# Patient Record
Sex: MALE | Race: OTHER
[De-identification: names, ages, dates, MRNs, and addresses within clinical notes are randomized per-mention and may not be internally consistent; named-entity substitution may affect disease eponyms.]

---

## 2021-02-16 ENCOUNTER — HOSPITAL ENCOUNTER (INPATIENT)
Dept: HOSPITAL 72 - EMR | Age: 61
LOS: 2 days | DRG: 871 | End: 2021-02-18
Payer: MEDICARE

## 2021-02-16 VITALS — SYSTOLIC BLOOD PRESSURE: 119 MMHG | DIASTOLIC BLOOD PRESSURE: 52 MMHG

## 2021-02-16 VITALS — WEIGHT: 175 LBS | BODY MASS INDEX: 27.47 KG/M2 | HEIGHT: 67 IN

## 2021-02-16 VITALS — DIASTOLIC BLOOD PRESSURE: 85 MMHG | SYSTOLIC BLOOD PRESSURE: 133 MMHG

## 2021-02-16 VITALS — DIASTOLIC BLOOD PRESSURE: 73 MMHG | SYSTOLIC BLOOD PRESSURE: 126 MMHG

## 2021-02-16 DIAGNOSIS — I69.322: ICD-10-CM

## 2021-02-16 DIAGNOSIS — R74.01: ICD-10-CM

## 2021-02-16 DIAGNOSIS — C22.8: ICD-10-CM

## 2021-02-16 DIAGNOSIS — K85.90: ICD-10-CM

## 2021-02-16 DIAGNOSIS — E78.5: ICD-10-CM

## 2021-02-16 DIAGNOSIS — N17.9: ICD-10-CM

## 2021-02-16 DIAGNOSIS — I10: ICD-10-CM

## 2021-02-16 DIAGNOSIS — R65.21: ICD-10-CM

## 2021-02-16 DIAGNOSIS — K72.00: ICD-10-CM

## 2021-02-16 DIAGNOSIS — I86.4: ICD-10-CM

## 2021-02-16 DIAGNOSIS — D68.4: ICD-10-CM

## 2021-02-16 DIAGNOSIS — J18.9: ICD-10-CM

## 2021-02-16 DIAGNOSIS — Z88.1: ICD-10-CM

## 2021-02-16 DIAGNOSIS — I48.91: ICD-10-CM

## 2021-02-16 DIAGNOSIS — E11.649: ICD-10-CM

## 2021-02-16 DIAGNOSIS — I85.10: ICD-10-CM

## 2021-02-16 DIAGNOSIS — J91.8: ICD-10-CM

## 2021-02-16 DIAGNOSIS — R18.8: ICD-10-CM

## 2021-02-16 DIAGNOSIS — K76.6: ICD-10-CM

## 2021-02-16 DIAGNOSIS — J96.01: ICD-10-CM

## 2021-02-16 DIAGNOSIS — I85.00: ICD-10-CM

## 2021-02-16 DIAGNOSIS — A41.9: Primary | ICD-10-CM

## 2021-02-16 DIAGNOSIS — N39.0: ICD-10-CM

## 2021-02-16 DIAGNOSIS — Z66: ICD-10-CM

## 2021-02-16 DIAGNOSIS — Z86.74: ICD-10-CM

## 2021-02-16 DIAGNOSIS — D64.9: ICD-10-CM

## 2021-02-16 LAB
ADD MANUAL DIFF: NO
ALBUMIN SERPL-MCNC: 1.8 G/DL (ref 3.4–5)
ALBUMIN/GLOB SERPL: 0.3 {RATIO} (ref 1–2.7)
ALP SERPL-CCNC: 634 U/L (ref 46–116)
ALT SERPL-CCNC: 74 U/L (ref 12–78)
AMMONIA PLAS-SCNC: 71 UMOL/L (ref 11–32)
ANION GAP SERPL CALC-SCNC: 12 MMOL/L (ref 5–15)
APPEARANCE UR: (no result)
APTT BLD: 43 SEC (ref 23–33)
APTT PPP: (no result) S
AST SERPL-CCNC: 180 U/L (ref 15–37)
BASOPHILS NFR BLD AUTO: 0.3 % (ref 0–2)
BILIRUB DIRECT SERPL-MCNC: 18.8 MG/DL (ref 0–0.3)
BILIRUB SERPL-MCNC: 26 MG/DL (ref 0.2–1)
BUN SERPL-MCNC: 45 MG/DL (ref 7–18)
CALCIUM SERPL-MCNC: 8.3 MG/DL (ref 8.5–10.1)
CHLORIDE SERPL-SCNC: 101 MMOL/L (ref 98–107)
CK SERPL-CCNC: 57 U/L (ref 26–308)
CO2 SERPL-SCNC: 20 MMOL/L (ref 21–32)
CREAT SERPL-MCNC: 1.8 MG/DL (ref 0.55–1.3)
EOSINOPHIL NFR BLD AUTO: 0.3 % (ref 0–3)
ERYTHROCYTE [DISTWIDTH] IN BLOOD BY AUTOMATED COUNT: 22.9 % (ref 11.6–14.8)
GLOBULIN SER-MCNC: 5.2 G/DL
GLUCOSE UR STRIP-MCNC: (no result) MG/DL
HCT VFR BLD CALC: 28.6 % (ref 42–52)
HGB BLD-MCNC: 8.9 G/DL (ref 14.2–18)
INR PPP: 1.8 (ref 0.9–1.1)
KETONES UR QL STRIP: (no result)
LEUKOCYTE ESTERASE UR QL STRIP: (no result)
LYMPHOCYTES NFR BLD AUTO: 5.5 % (ref 20–45)
MCV RBC AUTO: 99 FL (ref 80–99)
MONOCYTES NFR BLD AUTO: 11.2 % (ref 1–10)
NEUTROPHILS NFR BLD AUTO: 82.8 % (ref 45–75)
NITRITE UR QL STRIP: POSITIVE
PH UR STRIP: 5 [PH] (ref 4.5–8)
PLATELET # BLD: 118 K/UL (ref 150–450)
POTASSIUM SERPL-SCNC: 4.6 MMOL/L (ref 3.5–5.1)
PROT UR QL STRIP: (no result)
RBC # BLD AUTO: 2.88 M/UL (ref 4.7–6.1)
SODIUM SERPL-SCNC: 133 MMOL/L (ref 136–145)
SP GR UR STRIP: 1.02 (ref 1–1.03)
UROBILINOGEN UR-MCNC: 8 MG/DL (ref 0–1)
WBC # BLD AUTO: 14.6 K/UL (ref 4.8–10.8)

## 2021-02-16 PROCEDURE — 94003 VENT MGMT INPAT SUBQ DAY: CPT

## 2021-02-16 PROCEDURE — 87040 BLOOD CULTURE FOR BACTERIA: CPT

## 2021-02-16 PROCEDURE — 85651 RBC SED RATE NONAUTOMATED: CPT

## 2021-02-16 PROCEDURE — 85730 THROMBOPLASTIN TIME PARTIAL: CPT

## 2021-02-16 PROCEDURE — 92950 HEART/LUNG RESUSCITATION CPR: CPT

## 2021-02-16 PROCEDURE — 86304 IMMUNOASSAY TUMOR CA 125: CPT

## 2021-02-16 PROCEDURE — 86709 HEPATITIS A IGM ANTIBODY: CPT

## 2021-02-16 PROCEDURE — 82746 ASSAY OF FOLIC ACID SERUM: CPT

## 2021-02-16 PROCEDURE — 80202 ASSAY OF VANCOMYCIN: CPT

## 2021-02-16 PROCEDURE — 84100 ASSAY OF PHOSPHORUS: CPT

## 2021-02-16 PROCEDURE — 82140 ASSAY OF AMMONIA: CPT

## 2021-02-16 PROCEDURE — 86900 BLOOD TYPING SEROLOGIC ABO: CPT

## 2021-02-16 PROCEDURE — 93970 EXTREMITY STUDY: CPT

## 2021-02-16 PROCEDURE — 87086 URINE CULTURE/COLONY COUNT: CPT

## 2021-02-16 PROCEDURE — 83690 ASSAY OF LIPASE: CPT

## 2021-02-16 PROCEDURE — 87181 SC STD AGAR DILUTION PER AGT: CPT

## 2021-02-16 PROCEDURE — 86901 BLOOD TYPING SEROLOGIC RH(D): CPT

## 2021-02-16 PROCEDURE — 84550 ASSAY OF BLOOD/URIC ACID: CPT

## 2021-02-16 PROCEDURE — 82248 BILIRUBIN DIRECT: CPT

## 2021-02-16 PROCEDURE — 82150 ASSAY OF AMYLASE: CPT

## 2021-02-16 PROCEDURE — 83605 ASSAY OF LACTIC ACID: CPT

## 2021-02-16 PROCEDURE — 96376 TX/PRO/DX INJ SAME DRUG ADON: CPT

## 2021-02-16 PROCEDURE — 82378 CARCINOEMBRYONIC ANTIGEN: CPT

## 2021-02-16 PROCEDURE — 85610 PROTHROMBIN TIME: CPT

## 2021-02-16 PROCEDURE — 36415 COLL VENOUS BLD VENIPUNCTURE: CPT

## 2021-02-16 PROCEDURE — 80053 COMPREHEN METABOLIC PANEL: CPT

## 2021-02-16 PROCEDURE — 71045 X-RAY EXAM CHEST 1 VIEW: CPT

## 2021-02-16 PROCEDURE — 94002 VENT MGMT INPAT INIT DAY: CPT

## 2021-02-16 PROCEDURE — 87340 HEPATITIS B SURFACE AG IA: CPT

## 2021-02-16 PROCEDURE — 96365 THER/PROPH/DIAG IV INF INIT: CPT

## 2021-02-16 PROCEDURE — 85007 BL SMEAR W/DIFF WBC COUNT: CPT

## 2021-02-16 PROCEDURE — 82977 ASSAY OF GGT: CPT

## 2021-02-16 PROCEDURE — 85025 COMPLETE CBC W/AUTO DIFF WBC: CPT

## 2021-02-16 PROCEDURE — 83036 HEMOGLOBIN GLYCOSYLATED A1C: CPT

## 2021-02-16 PROCEDURE — 82962 GLUCOSE BLOOD TEST: CPT

## 2021-02-16 PROCEDURE — 86140 C-REACTIVE PROTEIN: CPT

## 2021-02-16 PROCEDURE — 84484 ASSAY OF TROPONIN QUANT: CPT

## 2021-02-16 PROCEDURE — 86850 RBC ANTIBODY SCREEN: CPT

## 2021-02-16 PROCEDURE — 83540 ASSAY OF IRON: CPT

## 2021-02-16 PROCEDURE — 82607 VITAMIN B-12: CPT

## 2021-02-16 PROCEDURE — 83735 ASSAY OF MAGNESIUM: CPT

## 2021-02-16 PROCEDURE — 84443 ASSAY THYROID STIM HORMONE: CPT

## 2021-02-16 PROCEDURE — 81003 URINALYSIS AUTO W/O SCOPE: CPT

## 2021-02-16 PROCEDURE — 82550 ASSAY OF CK (CPK): CPT

## 2021-02-16 PROCEDURE — 86920 COMPATIBILITY TEST SPIN: CPT

## 2021-02-16 PROCEDURE — 83550 IRON BINDING TEST: CPT

## 2021-02-16 PROCEDURE — 86803 HEPATITIS C AB TEST: CPT

## 2021-02-16 PROCEDURE — 93005 ELECTROCARDIOGRAM TRACING: CPT

## 2021-02-16 PROCEDURE — 74176 CT ABD & PELVIS W/O CONTRAST: CPT

## 2021-02-16 PROCEDURE — 80061 LIPID PANEL: CPT

## 2021-02-16 PROCEDURE — 86705 HEP B CORE ANTIBODY IGM: CPT

## 2021-02-16 PROCEDURE — 83880 ASSAY OF NATRIURETIC PEPTIDE: CPT

## 2021-02-16 PROCEDURE — 99291 CRITICAL CARE FIRST HOUR: CPT

## 2021-02-16 PROCEDURE — 84134 ASSAY OF PREALBUMIN: CPT

## 2021-02-16 PROCEDURE — 96375 TX/PRO/DX INJ NEW DRUG ADDON: CPT

## 2021-02-16 PROCEDURE — 85379 FIBRIN DEGRADATION QUANT: CPT

## 2021-02-16 PROCEDURE — 82728 ASSAY OF FERRITIN: CPT

## 2021-02-16 PROCEDURE — 82803 BLOOD GASES ANY COMBINATION: CPT

## 2021-02-16 PROCEDURE — 82105 ALPHA-FETOPROTEIN SERUM: CPT

## 2021-02-16 PROCEDURE — 83615 LACTATE (LD) (LDH) ENZYME: CPT

## 2021-02-16 PROCEDURE — 93306 TTE W/DOPPLER COMPLETE: CPT

## 2021-02-16 PROCEDURE — 76700 US EXAM ABDOM COMPLETE: CPT

## 2021-02-16 RX ADMIN — DOCUSATE SODIUM SCH MG: 100 CAPSULE, LIQUID FILLED ORAL at 20:34

## 2021-02-16 NOTE — NUR
NURSE NOTES:

received pt from ED. pt is alert and awake. assisted to the bed. denies any pain at this 
time. respiration is even and unlabored on room air. noted with generalized edema. elevated 
extremities on a pillow. pt is made comfortable in bed. placed call light within reach.

## 2021-02-16 NOTE — HISTORY AND PHYSICAL REPORT
DATE OF ADMISSION:  02/16/2021

CHIEF COMPLAINT:  Patient is a 60-year-old  male who presents with

chief complaint of generalized pain.



HISTORY OF PRESENT ILLNESS:  Patient has history of hypertension and

hypercholesterolemia.  Patient presented to [facility]

complaining of 2-day history of generalized pain.  Patient was found to be

in liver failure and also with atrial fibrillation with rapid ventricular

rate.  Patient is admitted with new-onset liver failure.



REVIEW OF SYSTEMS:  CONSTITUTIONAL:  Patient denies weight loss or gain.

Patient denies fevers or chills.  HEENT:  Patient denies ear or throat

pain.  Patient denies headache.  CARDIOVASCULAR:  Patient denies

palpitations or chest pain.  CHEST:  Patient denies wheeze or shortness of

breath.  ABDOMINAL:  Patient complains of generalized abdominal pain.

Patient denies nausea, vomiting, diarrhea, or constipation.

GENITOURINARY:  Patient denies dysuria or increased frequency of

urination.  NEUROMUSCULAR:  Patient complains of generalized pain as

above.  Patient denies seizures or generalized weakness.



PAST MEDICAL HISTORY:  Significant for:



1. Hypertension.

2. Hypercholesterolemia.



PAST SURGICAL HISTORY:  Patient denies.



CURRENT MEDICATIONS:

1. Atorvastatin 40 mg 1 tablet p.o. at bedtime.

2. Metoprolol tartrate 25 mg p.o. twice daily.



ALLERGIES:  Keflex.



SOCIAL HISTORY:  Patient is .  Patient denies tobacco or alcohol

use.



PHYSICAL EXAMINATION:

VITAL SIGNS:  Temperature 97.0, respirations 16, pulse 64 to 150, blood

pressure 119/52.

GENERAL:  Patient is well-developed and well-nourished  male, who

is obviously jaundiced.

HEENT:  Eyes with scleral icterus, otherwise pupils are equal and

responsive to light and accommodation.  Extraocular movements are

intact.

NECK:  Supple without lymphadenopathy.

CHEST:  Lungs are clear to auscultation bilaterally without wheezes or

rales.

CARDIOVASCULAR:  Regular rhythm and rate.  S1-S2 are normal without

murmurs, rubs, or gallops.

ABDOMEN:  Distended, decreased bowel sounds with no rebound or guarding to

palpitation.

RECTAL/GENITAL:  Refused.

NEUROLOGIC:  Cranial nerves II through XII are grossly intact without focal

deficits.  Motor strength is 5/5 bilaterally.  Deep tendon reflexes are 2+

plantar.



LABORATORY STUDIES:  WBC 14.6, hemoglobin 8.9, hematocrit 28.6, platelets

118,000.  Sodium 133, potassium 4.6, chloride 101, CO2 20, BUN 45,

creatinine 1.8, glucose 101.  Total bilirubin elevated at 26.0, direct

bilirubin elevated at 18.8, AST elevated at 180, alkaline phosphatase

elevated at 1634.  Ammonia level elevated at 71.  Lipase elevated at

greater than 2000.  Troponin 0.014.  Urinalysis showed 2+ protein, 1+

glucose, 1+ ketones, 4+ blood, positive nitrite, 3+ bilirubin with wbc's

too numerous to count.  A CT scan of the abdomen and pelvis was reported

as liver mass consistent with liver cancer and cirrhosis.



ASSESSMENT:  This is a 60-year-old  male.



1. Liver mass.

2. Liver failure.

3. Urinary tract infection.

4. Atrial fibrillation with rapid ventricular rate.

5. Pancreatitis.

6. Coagulopathy.

7. Renal failure.

8. Elevated liver function tests.

9. Hypertension.

10. Hypercholesterolemia.



TREATMENT:

1. Liver mass.  An Oncology consultation has been obtained with Dr. Lira.  Patient has a stated history of possible liver cancer, however

this has not been diagnosed.

2. Liver failure.  This may be secondary to liver cancer as above.  A

Gastroenterology consultation has been obtained with Dr. Shaq Linder.

3. Urinary tract infection.  Patient has been placed empirically on

intravenous Levaquin.  A urine culture is pending.

4. Atrial fibrillation with rapid ventricular rate.  A Cardiology

consultation has been obtained with Dr. Eduardo Briggs.

5. Pancreatitis.  As above, a Gastroenterology consultation has been

obtained with Dr. Shaq Linder.

6. Coagulopathy.  This is secondary to liver failure as above.

7. Renal failure.  This is probably secondary to hepatorenal failure.

8. Elevated liver function tests.

9. Hypertension.  Continue metoprolol as above.

10. Hypercholesterolemia.  Continue atorvastatin as above.









  ______________________________________________

  Tra Anguiano M.D. DR:  SEVERIANO

D:  02/16/2021 18:14

T:  02/16/2021 18:25

JOB#:  [id number]

CC:

## 2021-02-16 NOTE — DIAGNOSTIC IMAGING REPORT
Indication: Abdominal pain

 

Technique: Spiral acquisitions obtained through the abdomen and pelvis. No oral

contrast utilized, per emergency room physician request No IV contrast utilized,  per

referring physician request.. Multiplanar reconstructions were generated. Total dose

length product 595 mGycm. CTDIvol(s) 10 mGy. Dose reduction achieved using automated

exposure control

 

 

Comparison: None

 

Findings: Lack of enteric contrast limits assessment of the GI tract. There are

colonic diverticula. No definite evidence of diverticulitis. The appendix is normal.

There is a moderate amount of free intraperitoneal fluid. No free intraperitoneal

gas. The stomach and duodenum are unremarkable.

 

Lack of IV contrast limits assessment of the solid organs. The liver is very atrophic

and demonstrates extensive surface nodularity. Surgical clips are seen at the

inferior aspect of the left hepatic lobe. Arising from segment 4A, there is a round

exophytic mass which measures approximately 6.7 cm in diameter. This demonstrates

some peripheral calcification. Large perigastric and periesophageal varices are

demonstrated. There is a moderate amount of ascites fluid. A ventriculoperitoneal

shunt catheter is seen coursing through the peritoneal space and has its tip in the

pelvis.

 

The pancreas, spleen, adrenals, kidneys are grossly unremarkable. No retroperitoneal

or mesenteric mass or adenopathy. No pelvic mass or adenopathy.

 

There is bilateral gynecomastia. There is diffuse edema of the subcutaneous and

abdominal fat. There is a massive right pleural effusion. This results in complete

atelectasis of the right lower lobe. The included left lung base demonstrates some

atelectasis, no pleural fluid. The heart is borderline large. The bones are

unremarkable for age.

 

Impression: Evidence of hepatic cirrhosis, with atrophy and surface nodularity

 

6.7 cm round exophytic mass arising from segment 4A of the liver. This is presumably

related to stated clinical history of liver cancer. Surgical clips are seen adjacent

to this mass.

 

Evidence of portal hypertension, with large periesophageal and perigastric varices

 

Moderate ascites, likely related to liver disease. However, there is also a

ventriculoperitoneal shunt catheter which may be contributing some volume to the

ascites.

 

There is evidence of anasarca, including massive right pleural effusion, diffuse

edema of the subcutaneous and abdominal fat limiting assessment of the GI tract, due

to lack of enteric contrast administration. No gross acute GI pathology.

 

Colonic diverticulosis. No evidence of diverticulitis

 

Borderline cardiomegaly

 

Incidental finding bilateral gynecomastia

 

The CT scanner at [facility] is accredited by the [employer] College of

Radiology and the scans are performed using protocols designed to limit radiation

exposure to as low as reasonably achievable to attain images of sufficient resolution

adequate for diagnostic evaluation.

## 2021-02-16 NOTE — HISTORY & PHYSICAL
History and Physical


History & Physicial


Dictated for Int Med-Dr Harper no. [id number].











Tra Anguiano MD               Feb 16, 2021 18:13

## 2021-02-16 NOTE — DIAGNOSTIC IMAGING REPORT
Indication: Shortness of breath

 

Technique: One view of the chest

 

Comparison: none

 

Findings: There is a massive right pleural effusion, with only small amount of

aerated lung. The left pleural spaces clear. There may be some congestion of the left

lung interstitium. The heart size is difficult to assess. There is

ventriculoperitoneal shunt tubing

 

Impression: Massive right pleural effusion

 

Interstitial edema

## 2021-02-16 NOTE — NUR
NURSE HAND-OFF REPORT: 



Important Events on Shift:new admit

Patient Status: asleep and arousable

Diet: npo



Pending Orders: n/a

Pending Results/Labs:n/a

Pending MD notification:n/a



Latest Vital Signs: Temperature 98.6 , Pulse 117 , B/P 133 /85 , Respiratory Rate 20 , O2 
 , Nasal Cannula, O2 Flow Rate 2.0 .  

Vital Sign Comment: stable



EKG Rhythm: Atrial Fibrillation

Rhythm change?: N 

MD Notified?: -

MD Response: 



Latest Espinoza Fall Score: 45  

Fall Risk: High Risk 

Safety Measures: Call light , Bed Alarm , Side Rails Side Rails x3, Bed position Low and 
Locked.

Fall Precautions: 



Report given to Serina.

## 2021-02-16 NOTE — EMERGENCY ROOM REPORT
History of Present Illness


General


Chief Complaint:  Abdominal Pain


Source:  Patient





Present Illness


HPI


Patient presents complaining of total body pain.  He is also jaundiced.  

Apparently was recently discharged from the hospital.  The pain throughout his 

body is severe.  He was discharged on Dilaudid.  Unknown whether he has been 

receiving this medication.





He has a history of liver cancer.  There is a suggestion that he was discharged 

to hospice.  There is no clear documentation of this.  However family contacted 

his private doctor and requested repeat evaluation and therefore he was brought 

to the emergency department.





History of diabetes, hypertension and cirrhosis.





Patient denies fevers or chills.





Review of outside records reveal that he has had atrial fibrillation with rapid 

ventricular rate.





Patient denies dysuria.  He has had loose stools without passing any blood or 

melena.  He is felt nauseated but denies vomiting.  There is no coffee-ground or

bloody vomitus.





The patient has a nonproductive cough.  He does not feel short of breath at this

time.


Allergies:  


Coded Allergies:  


     CEPHALEXIN (Verified  Allergy, Unknown, 2/16/21)





COVID-19 Screening


Contact w/high risk pt:  No


Experienced COVID-19 symptoms?:  No


COVID-19 Testing performed PTA:  No





Patient History


Past Medical History:  see triage record, other - Varices, liver mass


Past Surgical History:  other - shunt


Social History:  Denies: smoking


Social History Narrative


With family


Reviewed Nursing Documentation:  PMH: Agreed; PSxH: Agreed





Nursing Documentation-PMH


Past Medical History:  No History, Except For


Hx Hypertension:  Yes


Hx Diabetes:  Yes


Hx Cancer:  Yes - liver


Hx Gastrointestinal Problems:  Yes - liver cirrosis





Review of Systems


All Other Systems:  negative except mentioned in HPI





Physical Exam





Vital Signs








  Date Time  Temp Pulse Resp B/P (MAP) Pulse Ox O2 Delivery O2 Flow Rate FiO2


 


2/16/21 10:40 97.0 56 14 129/58 (81) 96 Room Air  








Sp02 EP Interpretation:  reviewed, normal


General Appearance:  mild distress, other - Jaundiced in mild distress, 

Chronically Ill


Eyes:  bilateral eye PERRL, bilateral eye EOMI, bilateral eye scleral icterus


ENT:  dry mucus membranes - Poor dentition


Neck:  full range of motion, supple


Respiratory:  lungs clear, normal breath sounds, no respiratory distress


Cardiovascular #1:  tachycardia, edema


Cardiovascular #2:  2+ radial (R)


Gastrointestinal:  soft, no guarding, no rebound, tenderness, decreased bowel 

sounds, overweight


Genitourinary:  no CVA tenderness


Musculoskeletal:  back normal, no calf tenderness


Neurologic:  alert, oriented, DTRs symmetric, sensory intact, motor weakness - 

Diffuse, other - No asterixis


Psychiatric:  mood/affect normal


Skin:  warm/dry, jaundice





Procedures


Critical Care Time


Critical Care Time


Total Critical Care Time: 35 min bedside evaluation and treatment excludes 

procedures (EKG).


Reason for critical care: Liver failure, atrial fibrillation rapid ventricular 

response, urinary tract infection with increased lactic lactic acid, repeat 

evaluations, review of outside records


Possible complications: hypotension, hypertension, MI, shock, arrhythmias, 

metabolic acidosis, end organ damage, respiratory failure.


Interventions: Review of outside records, evaluation of possible hospice status,

treatment of pain with repeat evaluations. Evaluation of tachycardia.  Treatment

of possible early sepsis with elevated lactic acid.  Initiation of antibiotics, 

fluids.  Metoprolol given for  atrial fibrillation with rapid ventricular 

response.


Course: Patient with jaundice and history of liver cancer presents with body 

pain.  Complicated patient.  Please see interventions above.  Repeat evaluations

undertaken.  Repeat treatments of pain.  Treatment of tachycardia initially with

pain medication.  Fluid bolus and antibiotics instituted for urinary tract 

infection when urine was finally obtained.  Repeated doses of analgesics with 

repeat evaluations.  Discussion with admitting physician.


Consultations: nursing staff, primary physician office staff,  admitting 

physician


Performed by: Dr. Corcoran


Tolerated well condition = serious





Medical Decision Making


Diagnostic Impression:  


   Primary Impression:  


   Liver failure


   Qualified Codes:  K72.00 - Acute and subacute hepatic failure without coma


   Additional Impressions:  


   Pancreatitis


   Qualified Codes:  K85.90 - Acute pancreatitis without necrosis or infection, 

   unspecified


   Leukocytosis


   Qualified Codes:  D72.829 - Elevated white blood cell count, unspecified


   Renal insufficiency


   Coagulopathy


   Atrial fibrillation with RVR


   UTI (urinary tract infection)


   Qualified Codes:  N39.0 - Urinary tract infection, site not specified


   Pleural effusion associated with hepatic disorder


ER Course


Patient with known liver disease presents with total body pain.  Differential 

includes sepsis, liver failure, occult infection, spontaneous bacterial 

peritonitis, pneumonia, opiate dependence amongst others.  Patient evaluated 

EKG, chest x-ray, CT of the abdomen and labs.  Patient treated with analgesia.  

Complicated patient.  Patient placed on cardiac monitor.





Ext jug started by me.





EKG without injury.  Tachycardia with PVCs.  Chest x-ray poor inspiration.  CT 

abdomen see below with liver mass.  Labs with leukocytosis.  Anemia.  Elevated 

lipase.  Elevated ammonia.





Patient dropped his oxygen saturation to 92%.  Oxygen begun.  In no respiratory 

distress.





Patient c/o pain.  Morphine increased.





Still complain of pain and Dilaudid administered.





A fib with RVR.  Metoprolol ordered.  Also small fluid bolus.  1420





Delay for obtaining urinalysis.  Late addition of lactic acid which returns el

evated.  Antibiotics have been started for pyuria. Sepsis re-evaluation.  Mental

status unchanged.  Good perfusion. 





Heart rate improved with metoprolol and fluids.  Discussed with the primary 

physician.





Laboratory Tests








Test


 2/16/21


11:11 2/16/21


14:24


 


White Blood Count


 14.6 K/UL


(4.8-10.8)  H 





 


Red Blood Count


 2.88 M/UL


(4.70-6.10)  L 





 


Hemoglobin


 8.9 G/DL


(14.2-18.0)  L 





 


Hematocrit


 28.6 %


(42.0-52.0)  L 





 


Mean Corpuscular Volume 99 FL (80-99)   


 


Mean Corpuscular Hemoglobin


 30.9 PG


(27.0-31.0) 





 


Mean Corpuscular Hemoglobin


Concent 31.1 G/DL


(32.0-36.0)  L 





 


Red Cell Distribution Width


 22.9 %


(11.6-14.8)  H 





 


Platelet Count


 118 K/UL


(150-450)  L 





 


Mean Platelet Volume


 8.0 FL


(6.5-10.1) 





 


Neutrophils (%) (Auto)


 82.8 %


(45.0-75.0)  H 





 


Lymphocytes (%) (Auto)


 5.5 %


(20.0-45.0)  L 





 


Monocytes (%) (Auto)


 11.2 %


(1.0-10.0)  H 





 


Eosinophils (%) (Auto)


 0.3 %


(0.0-3.0) 





 


Basophils (%) (Auto)


 0.3 %


(0.0-2.0) 





 


Prothrombin Time


 18.8 SEC


(9.30-11.50)  H 





 


Prothrombin Time INR


 1.8 (0.9-1.1)


H 





 


Activated Partial


Thromboplast Time 43 SEC (23-33)


H 





 


Sodium Level


 133 MMOL/L


(136-145)  L 





 


Potassium Level


 4.6 MMOL/L


(3.5-5.1) 





 


Chloride Level


 101 MMOL/L


() 





 


Carbon Dioxide Level


 20 MMOL/L


(21-32)  L 





 


Anion Gap


 12 mmol/L


(5-15) 





 


Blood Urea Nitrogen


 45 mg/dL


(7-18)  H 





 


Creatinine


 1.8 MG/DL


(0.55-1.30)  H 





 


Estimated Glomerular


Filtration Rate 38.9 mL/min


(>60) 





 


Glucose Level


 101 MG/DL


() 





 


Lactic Acid Level


 3.40 mmol/L


(0.4-2.0)  H 





 


Calcium Level


 8.3 MG/DL


(8.5-10.1)  L 





 


Total Bilirubin


 26.0 MG/DL


(0.2-1.0)  H 





 


Direct Bilirubin


 18.8 MG/DL


(0.0-0.3)  H 





 


Aspartate Amino Transferase


(AST) 180 U/L


(15-37)  H 





 


Alanine Aminotransferase (ALT)


 74 U/L (12-78)


 





 


Alkaline Phosphatase


 634 U/L


()  H 





 


Ammonia


 71 umol/L


(11-32)  H 





 


Total Creatine Kinase


 57 U/L


() 





 


Troponin I


 0.014 ng/mL


(0.000-0.056) 





 


Total Protein


 7.0 G/DL


(6.4-8.2) 





 


Albumin


 1.8 G/DL


(3.4-5.0)  L 





 


Globulin 5.2 g/dL   


 


Albumin/Globulin Ratio


 0.3 (1.0-2.7)


L 





 


Lipase


 > 2000 U/L


()  H 





 


Serum Alcohol < 3 mg/dL   


 


Urine Color  Orange  


 


Urine Appearance  Cloudy  


 


Urine pH  5 (4.5-8.0)  


 


Urine Specific Gravity


 


 1.020


(1.005-1.035)


 


Urine Protein


 


 2+ (NEGATIVE)


H


 


Urine Glucose (UA)


 


 1+ (NEGATIVE)


H


 


Urine Ketones


 


 1+ (NEGATIVE)


H


 


Urine Blood


 


 4+ (NEGATIVE)


H


 


Urine Nitrite


 


 Positive


(NEGATIVE)  H


 


Urine Bilirubin


 


 3+ (NEGATIVE)


H


 


Urine Ictotest


 


 Positive


(NEGATIVE)


 


Urine Urobilinogen


 


 8 MG/DL


(0.0-1.0)  H


 


Urine Leukocyte Esterase


 


 3+ (NEGATIVE)


H


 


Urine RBC


 


 2-4 /HPF (0 -


0)  H


 


Urine WBC


 


 Tntc /HPF (0 -


0)  H


 


Urine Squamous Epithelial


Cells 


 Occasional


/LPF


 


Urine Bacteria


 


 Many /HPF


(NONE)  H


 


Urine Coarse Granular Casts


 


 0-2 /LPF


(NONE)  H








EKG Diagnostic Results


Rate:  normal


Rhythm:  other - Atrial bigeminy


ST Segments:  no acute changes





Rhythm Strip Diag. Results


EP Interpretation:  yes


Rhythm:  NSR, other - Atrial bigeminy





Chest X-Ray Diagnostic Results


Chest X-Ray Diagnostic Results :  


   Chest X-Ray Ordered:  Yes


   # of Views/Limited/Complete:  1 View


   Indication:  Other


   EP Interpretation:  Yes


   Interpretation:  no pneumothorax, other - Right pleural effusion


   Impression:  Other





CT/MRI/US Diagnostic Results


CT/MRI/US Diagnostic Results :  


   Imaging Test Ordered:  Abdomen and pelvis


   Impression


Impression: Evidence of hepatic cirrhosis, with atrophy and surface nodularity


 


6.7 cm round exophytic mass arising from segment 4A of the liver. This is 

presumably


related to stated clinical history of liver cancer. Surgical clips are seen 

adjacent


to this mass.


 


Evidence of portal hypertension, with large periesophageal and perigastric 

varices


 


Moderate ascites, likely related to liver disease. However, there is also a 

ventriculoperitoneal shunt catheter which may be contributing some volume to the

ascites.


 


There is evidence of anasarca, including massive right pleural effusion, diffuse

edema of the subcutaneous and abdominal fat limiting assessment of the GI tract,

due to lack of enteric contrast administration. No gross acute GI pathology.


 


Colonic diverticulosis. No evidence of diverticulitis


 


Borderline cardiomegaly


 


Incidental finding bilateral gynecomastia





Last Vital Signs








  Date Time  Temp Pulse Resp B/P (MAP) Pulse Ox O2 Delivery O2 Flow Rate FiO2


 


2/16/21 16:00 98.6 109 20 133/85 (101) 100   


 


2/16/21 15:49      Room Air  


 


2/16/21 13:11       2.0 


 


2/16/21 13:11        100








Status:  improved


Disposition:  ADMITTED AS INPATIENT


Condition:  Serious











Arnaud Corcoran MD                Feb 16, 2021 11:26

## 2021-02-16 NOTE — NUR
NURSE NOTES:

Received report from LEONOR Roland. Pt is A/O x1-2 and verbally responsive. Pt is Cam[place] 
speaking with simple English. Cardiac monitor shows Sinus Rhythm at 89. No SOB or acute 
distress. Pain noted in epigastric area but subsided to minimal because of the medication 
given in ED. Will continue to follow up regarding medication needed. Pt is noted with 2+ 
edema. EJ 22G noted on the left side with NS running @ 50cc/hr. Will continue plan of care.

## 2021-02-16 NOTE — NUR
ED Nurse Note:pt. was brought from home with c/o abdominal and generalized 
pain, has hx of liver cirrosis and CA, pt. is A/Ox3, non-ambulatory at this 
time

## 2021-02-16 NOTE — CONSULTATION
History of Present Illness


General


Date patient seen:  Feb 16, 2021


Reason for Hospitalization:  Abdominal Pain





Present Illness


HPI


/60-year-old male with known history of liver disease at present [facility] complaining of total body pain as well as abdominal pain.  Noted to have 

significantly elevated LFTs T bili in the 20s jaundice abdominal distention 

fluid overload CT noted surgery called to evaluate assist with care patient 

seen, patient eval, chart reviewed.  Abdominal pain related to underlying liver 

dysfunction no acute abdomen.  Labs reviewed imaging reviewed patient states 

passing flatus and bowel movement.  States abdominal pain is generalized 10 out 

of 10 only better with pain medication.  States he is hungry.  No nausea 

vomiting fever chills heavy etoh


Allergies:  


Coded Allergies:  


     CEPHALEXIN (Verified  Allergy, Unknown, 2/16/21)





COVID-19 Screening


Contact w/high risk pt:  No


Experienced COVID-19 symptoms?:  No





Medication History


Scheduled


Atorvastatin Calcium* (Atorvastatin Calcium*), 40 MG ORAL BEDTIME, (Reported)


Metoprolol Tartrate* (Metoprolol Tartrate*), 25 MG ORAL EVERY 12 HOURS, 

(Reported)





Scheduled PRN


Ondansetron (Zofran), 4 MG ORAL Q6H PRN for Nausea & Vomiting, (Reported)





Patient History


History Provided By:  Patient, Medical Record, PMD


Healthcare decision maker





Resuscitation status





Advanced Directive on File








Past Medical/Surgical History


Past Medical/Surgical History:  


(1) UTI (urinary tract infection)


(2) Coagulopathy


(3) Leukocytosis


(4) Pancreatitis


(5) Renal insufficiency


(6) Liver failure


(7) Atrial fibrillation with RVR





Review of Systems


Review of Symptoms


General ROS: no weight loss or fever


Psychological ROS: no depression or mood changes, no memory loss


Ophthalmic ROS: no visual changes or eye irritation


ENT ROS: no nasal congestion, hearing loss, dizziness


Allergy and Immunology ROS: no allergic symptoms or urticaria


Hematological and Lymphatic ROS: no swollen glands, unusual bleeding or bruising


Endocrine ROS: no polyuria, polydipsia, weight changes, temperature intolerance


Respiratory ROS: no cough, shortness of breath, or wheezing


Cardiovascular ROS: no chest pain or dyspnea on exertion


Gastrointestinal ROS: ++ abdominal pain, bright red blood in stool.


Musculoskeletal ROS: no myalgias or arthralgias


Neurological ROS: no TIA or stroke symptoms


Dermatological ROS: no new or changing skin lesions, rashes or pruritis





Physical Exam


Physical Exam


General appearance:  alert, cooperative, no distress, appears stated age


Head:  Normocephalic, without obvious abnormality, atraumatic


Eyes:  conjunctivae/corneas clear. PERRL, EOM's intact. Fundi benign jaundice 


Throat:  Lips, mucosa, and tongue normal. Teeth and gums normal


Neck:  supple, symmetrical, trachea midline, no adenopathy, thyroid: not 

enlarged, symmetric, no tenderness/mass/nodules, no carotid bruit and no JVD


Lungs:  clear to auscultation bilaterally


Heart:  regular rate and rhythm, S1, S2 normal, no murmur, click, rub or gallop


Abdomen:  soft, non-tender. Bowel sounds normal. No masses,  no organomegaly


Extremities:  extremities normal, atraumatic, no cyanosis or edema


Pulses:  2+ and symmetric


Skin:  Skin color, texture, turgor normal. No rashes or lesions


Neurologic:  Grossly normal





Last 24 Hour Vital Signs








  Date Time  Temp Pulse Resp B/P (MAP) Pulse Ox O2 Delivery O2 Flow Rate FiO2


 


2/16/21 16:00 98.6 109 20 133/85 (101) 100   


 


2/16/21 15:49      Room Air  


 


2/16/21 14:36  144  120/78    


 


2/16/21 13:11 97.0 72 17 119/52 100 Nasal Cannula 2.0 


 


2/16/21 13:11  64 16   Nasal Cannula 2.0 100


 


2/16/21 13:09  150 20   Room Air  


 


2/16/21 10:40 97.0 56 14 129/58 (81) 96 Room Air  











Laboratory Tests








Test


 2/16/21


11:11 2/16/21


14:24


 


White Blood Count


 14.6 K/UL


(4.8-10.8)  H 





 


Red Blood Count


 2.88 M/UL


(4.70-6.10)  L 





 


Hemoglobin


 8.9 G/DL


(14.2-18.0)  L 





 


Hematocrit


 28.6 %


(42.0-52.0)  L 





 


Mean Corpuscular Volume 99 FL (80-99)   


 


Mean Corpuscular Hemoglobin


 30.9 PG


(27.0-31.0) 





 


Mean Corpuscular Hemoglobin


Concent 31.1 G/DL


(32.0-36.0)  L 





 


Red Cell Distribution Width


 22.9 %


(11.6-14.8)  H 





 


Platelet Count


 118 K/UL


(150-450)  L 





 


Mean Platelet Volume


 8.0 FL


(6.5-10.1) 





 


Neutrophils (%) (Auto)


 82.8 %


(45.0-75.0)  H 





 


Lymphocytes (%) (Auto)


 5.5 %


(20.0-45.0)  L 





 


Monocytes (%) (Auto)


 11.2 %


(1.0-10.0)  H 





 


Eosinophils (%) (Auto)


 0.3 %


(0.0-3.0) 





 


Basophils (%) (Auto)


 0.3 %


(0.0-2.0) 





 


Prothrombin Time


 18.8 SEC


(9.30-11.50)  H 





 


Prothromb Time International


Ratio 1.8 (0.9-1.1)


H 





 


Activated Partial


Thromboplast Time 43 SEC (23-33)


H 





 


Sodium Level


 133 MMOL/L


(136-145)  L 





 


Potassium Level


 4.6 MMOL/L


(3.5-5.1) 





 


Chloride Level


 101 MMOL/L


() 





 


Carbon Dioxide Level


 20 MMOL/L


(21-32)  L 





 


Anion Gap


 12 mmol/L


(5-15) 





 


Blood Urea Nitrogen


 45 mg/dL


(7-18)  H 





 


Creatinine


 1.8 MG/DL


(0.55-1.30)  H 





 


Estimat Glomerular Filtration


Rate 38.9 mL/min


(>60) 





 


Glucose Level


 101 MG/DL


() 





 


Lactic Acid Level


 3.40 mmol/L


(0.4-2.0)  H 





 


Calcium Level


 8.3 MG/DL


(8.5-10.1)  L 





 


Total Bilirubin


 26.0 MG/DL


(0.2-1.0)  H 





 


Direct Bilirubin


 18.8 MG/DL


(0.0-0.3)  H 





 


Aspartate Amino Transf


(AST/SGOT) 180 U/L


(15-37)  H 





 


Alanine Aminotransferase


(ALT/SGPT) 74 U/L (12-78)


 





 


Alkaline Phosphatase


 634 U/L


()  H 





 


Ammonia


 71 umol/L


(11-32)  H 





 


Total Creatine Kinase


 57 U/L


() 





 


Troponin I


 0.014 ng/mL


(0.000-0.056) 





 


Total Protein


 7.0 G/DL


(6.4-8.2) 





 


Albumin


 1.8 G/DL


(3.4-5.0)  L 





 


Globulin 5.2 g/dL   


 


Albumin/Globulin Ratio


 0.3 (1.0-2.7)


L 





 


Lipase


 > 2000 U/L


()  H 





 


Serum Alcohol < 3 mg/dL   


 


Urine Color  Orange  


 


Urine Appearance  Cloudy  


 


Urine pH  5 (4.5-8.0)  


 


Urine Specific Gravity


 


 1.020


(1.005-1.035)


 


Urine Protein


 


 2+ (NEGATIVE)


H


 


Urine Glucose (UA)


 


 1+ (NEGATIVE)


H


 


Urine Ketones


 


 1+ (NEGATIVE)


H


 


Urine Blood


 


 4+ (NEGATIVE)


H


 


Urine Nitrite


 


 Positive


(NEGATIVE)  H


 


Urine Bilirubin


 


 3+ (NEGATIVE)


H


 


Urine Ictotest


 


 Positive


(NEGATIVE)


 


Urine Urobilinogen


 


 8 MG/DL


(0.0-1.0)  H


 


Urine Leukocyte Esterase


 


 3+ (NEGATIVE)


H


 


Urine RBC


 


 2-4 /HPF (0 -


0)  H


 


Urine WBC


 


 Tntc /HPF (0 -


0)  H


 


Urine Squamous Epithelial


Cells 


 Occasional


/LPF


 


Urine Bacteria


 


 Many /HPF


(NONE)  H


 


Urine Coarse Granular Casts


 


 0-2 /LPF


(NONE)  H








Height (Feet):  5


Height (Inches):  7.00


Weight (Pounds):  175





Assessment/Plan


Problem List:  


(1) UTI (urinary tract infection)


ICD Codes:  N39.0 - Urinary tract infection, site not specified


SNOMED:  51209538


(2) Coagulopathy


ICD Codes:  D68.9 - Coagulation defect, unspecified


SNOMED:  26978538


(3) Leukocytosis


ICD Codes:  D72.829 - Elevated white blood cell count, unspecified


SNOMED:  937523161, 667617218


(4) Pancreatitis


Assessment & Plan:  60-year-old male with liver disease now pancreatitis.  

Abdominal pain.  Labs noted imaging reviewed.  No acute surgical intervention 

necessary at this time.  GI eval liver eval n.p.o. IV fluids trend labs we will 

follow with you on exam recommendations thank you for letting present patient's 

care





There are


colonic diverticula. No definite evidence of diverticulitis. The appendix is 

normal.


There is a moderate amount of free intraperitoneal fluid. No free 

intraperitoneal


gas. The stomach and duodenum are unremarkable.


 


Lack of IV contrast limits assessment of the solid organs. The liver is very 

atrophic


and demonstrates extensive surface nodularity. Surgical clips are seen at the


inferior aspect of the left hepatic lobe. Arising from segment 4A, there is a 

round


exophytic mass which measures approximately 6.7 cm in diameter. This 

demonstrates


some peripheral calcification. Large perigastric and periesophageal varices are


demonstrated. There is a moderate amount of ascites fluid. A 

ventriculoperitoneal


shunt catheter is seen coursing through the peritoneal space and has its tip in 

the


pelvis.


 


The pancreas, spleen, adrenals, kidneys are grossly unremarkable. No 

retroperitoneal


or mesenteric mass or adenopathy. No pelvic mass or adenopathy.


 


There is bilateral gynecomastia. There is diffuse edema of the subcutaneous and


abdominal fat. There is a massive right pleural effusion. This results in 

complete


atelectasis of the right lower lobe. The included left lung base demonstrates 

some


atelectasis, no pleural fluid. The heart is borderline large. The bones are


unremarkable for age.


 


Impression: Evidence of hepatic cirrhosis, with atrophy and surface nodularity


 


6.7 cm round exophytic mass arising from segment 4A of the liver. This is 

presumably


related to stated clinical history of liver cancer. Surgical clips are seen 

adjacent


to this mass.


 


Evidence of portal hypertension, with large periesophageal and perigastric 

varices


 


Moderate ascites, likely related to liver disease. However, there is also a


ventriculoperitoneal shunt catheter which may be contributing some volume to the


ascites.


 


There is evidence of anasarca, including massive right pleural effusion, diffuse


edema of the subcutaneous and abdominal fat limiting assessment of the GI tract,

due


to lack of enteric contrast administration. No gross acute GI pathology.


 


Colonic diverticulosis. No evidence of diverticulitis


 


Borderline cardiomegaly


 


Incidental finding bilateral gynecomastia


ICD Codes:  K85.90 - Acute pancreatitis without necrosis or infection, 

unspecified


SNOMED:  74825268


(5) Renal insufficiency


ICD Codes:  N28.9 - Disorder of kidney and ureter, unspecified


SNOMED:  199689580, 022791918


(6) Liver failure


ICD Codes:  K72.90 - Hepatic failure, unspecified without coma


SNOMED:  74347930


(7) Atrial fibrillation with RVR


ICD Codes:  I48.91 - Unspecified atrial fibrillation


SNOMED:  510106187714272











Tino Norman                Feb 16, 2021 16:45

## 2021-02-17 VITALS — SYSTOLIC BLOOD PRESSURE: 75 MMHG | DIASTOLIC BLOOD PRESSURE: 36 MMHG

## 2021-02-17 VITALS — DIASTOLIC BLOOD PRESSURE: 55 MMHG | SYSTOLIC BLOOD PRESSURE: 89 MMHG

## 2021-02-17 VITALS — SYSTOLIC BLOOD PRESSURE: 98 MMHG | DIASTOLIC BLOOD PRESSURE: 55 MMHG

## 2021-02-17 VITALS — DIASTOLIC BLOOD PRESSURE: 63 MMHG | SYSTOLIC BLOOD PRESSURE: 114 MMHG

## 2021-02-17 VITALS — SYSTOLIC BLOOD PRESSURE: 73 MMHG | DIASTOLIC BLOOD PRESSURE: 50 MMHG

## 2021-02-17 VITALS — SYSTOLIC BLOOD PRESSURE: 86 MMHG | DIASTOLIC BLOOD PRESSURE: 50 MMHG

## 2021-02-17 VITALS — SYSTOLIC BLOOD PRESSURE: 85 MMHG | DIASTOLIC BLOOD PRESSURE: 42 MMHG

## 2021-02-17 VITALS — DIASTOLIC BLOOD PRESSURE: 44 MMHG | SYSTOLIC BLOOD PRESSURE: 101 MMHG

## 2021-02-17 VITALS — SYSTOLIC BLOOD PRESSURE: 97 MMHG | DIASTOLIC BLOOD PRESSURE: 65 MMHG

## 2021-02-17 VITALS — DIASTOLIC BLOOD PRESSURE: 51 MMHG | SYSTOLIC BLOOD PRESSURE: 70 MMHG

## 2021-02-17 VITALS — SYSTOLIC BLOOD PRESSURE: 106 MMHG | DIASTOLIC BLOOD PRESSURE: 65 MMHG

## 2021-02-17 VITALS — DIASTOLIC BLOOD PRESSURE: 18 MMHG | SYSTOLIC BLOOD PRESSURE: 70 MMHG

## 2021-02-17 VITALS — DIASTOLIC BLOOD PRESSURE: 68 MMHG | SYSTOLIC BLOOD PRESSURE: 82 MMHG

## 2021-02-17 VITALS — SYSTOLIC BLOOD PRESSURE: 88 MMHG | DIASTOLIC BLOOD PRESSURE: 56 MMHG

## 2021-02-17 VITALS — SYSTOLIC BLOOD PRESSURE: 138 MMHG | DIASTOLIC BLOOD PRESSURE: 85 MMHG

## 2021-02-17 VITALS — DIASTOLIC BLOOD PRESSURE: 62 MMHG | SYSTOLIC BLOOD PRESSURE: 88 MMHG

## 2021-02-17 VITALS — DIASTOLIC BLOOD PRESSURE: 21 MMHG | SYSTOLIC BLOOD PRESSURE: 80 MMHG

## 2021-02-17 VITALS — DIASTOLIC BLOOD PRESSURE: 75 MMHG | SYSTOLIC BLOOD PRESSURE: 143 MMHG

## 2021-02-17 VITALS — DIASTOLIC BLOOD PRESSURE: 55 MMHG | SYSTOLIC BLOOD PRESSURE: 88 MMHG

## 2021-02-17 VITALS — DIASTOLIC BLOOD PRESSURE: 58 MMHG | SYSTOLIC BLOOD PRESSURE: 101 MMHG

## 2021-02-17 VITALS — SYSTOLIC BLOOD PRESSURE: 78 MMHG | DIASTOLIC BLOOD PRESSURE: 46 MMHG

## 2021-02-17 VITALS — DIASTOLIC BLOOD PRESSURE: 23 MMHG | SYSTOLIC BLOOD PRESSURE: 56 MMHG

## 2021-02-17 VITALS — DIASTOLIC BLOOD PRESSURE: 24 MMHG | SYSTOLIC BLOOD PRESSURE: 63 MMHG

## 2021-02-17 VITALS — SYSTOLIC BLOOD PRESSURE: 80 MMHG | DIASTOLIC BLOOD PRESSURE: 46 MMHG

## 2021-02-17 VITALS — DIASTOLIC BLOOD PRESSURE: 55 MMHG | SYSTOLIC BLOOD PRESSURE: 81 MMHG

## 2021-02-17 VITALS — SYSTOLIC BLOOD PRESSURE: 123 MMHG | DIASTOLIC BLOOD PRESSURE: 80 MMHG

## 2021-02-17 VITALS — SYSTOLIC BLOOD PRESSURE: 98 MMHG | DIASTOLIC BLOOD PRESSURE: 51 MMHG

## 2021-02-17 VITALS — DIASTOLIC BLOOD PRESSURE: 13 MMHG | SYSTOLIC BLOOD PRESSURE: 73 MMHG

## 2021-02-17 VITALS — DIASTOLIC BLOOD PRESSURE: 57 MMHG | SYSTOLIC BLOOD PRESSURE: 105 MMHG

## 2021-02-17 VITALS — DIASTOLIC BLOOD PRESSURE: 109 MMHG | SYSTOLIC BLOOD PRESSURE: 170 MMHG

## 2021-02-17 VITALS — SYSTOLIC BLOOD PRESSURE: 118 MMHG | DIASTOLIC BLOOD PRESSURE: 84 MMHG

## 2021-02-17 VITALS — DIASTOLIC BLOOD PRESSURE: 57 MMHG | SYSTOLIC BLOOD PRESSURE: 89 MMHG

## 2021-02-17 VITALS — DIASTOLIC BLOOD PRESSURE: 60 MMHG | SYSTOLIC BLOOD PRESSURE: 96 MMHG

## 2021-02-17 VITALS — SYSTOLIC BLOOD PRESSURE: 100 MMHG | DIASTOLIC BLOOD PRESSURE: 70 MMHG

## 2021-02-17 VITALS — DIASTOLIC BLOOD PRESSURE: 25 MMHG | SYSTOLIC BLOOD PRESSURE: 65 MMHG

## 2021-02-17 VITALS — DIASTOLIC BLOOD PRESSURE: 76 MMHG | SYSTOLIC BLOOD PRESSURE: 130 MMHG

## 2021-02-17 VITALS — SYSTOLIC BLOOD PRESSURE: 84 MMHG | DIASTOLIC BLOOD PRESSURE: 52 MMHG

## 2021-02-17 VITALS — SYSTOLIC BLOOD PRESSURE: 72 MMHG | DIASTOLIC BLOOD PRESSURE: 42 MMHG

## 2021-02-17 VITALS — DIASTOLIC BLOOD PRESSURE: 53 MMHG | SYSTOLIC BLOOD PRESSURE: 91 MMHG

## 2021-02-17 VITALS — SYSTOLIC BLOOD PRESSURE: 99 MMHG | DIASTOLIC BLOOD PRESSURE: 70 MMHG

## 2021-02-17 VITALS — DIASTOLIC BLOOD PRESSURE: 57 MMHG | SYSTOLIC BLOOD PRESSURE: 93 MMHG

## 2021-02-17 VITALS — SYSTOLIC BLOOD PRESSURE: 43 MMHG | DIASTOLIC BLOOD PRESSURE: 26 MMHG

## 2021-02-17 VITALS — SYSTOLIC BLOOD PRESSURE: 70 MMHG | DIASTOLIC BLOOD PRESSURE: 55 MMHG

## 2021-02-17 VITALS — DIASTOLIC BLOOD PRESSURE: 56 MMHG | SYSTOLIC BLOOD PRESSURE: 76 MMHG

## 2021-02-17 LAB
ADD MANUAL DIFF: YES
ALBUMIN SERPL-MCNC: 1.9 G/DL (ref 3.4–5)
ALBUMIN/GLOB SERPL: 0.4 {RATIO} (ref 1–2.7)
ALP SERPL-CCNC: 659 U/L (ref 46–116)
ALT SERPL-CCNC: 80 U/L (ref 12–78)
AMYLASE SERPL-CCNC: 1661 U/L (ref 25–115)
ANION GAP SERPL CALC-SCNC: 16 MMOL/L (ref 5–15)
APTT BLD: 52 SEC (ref 23–33)
AST SERPL-CCNC: 204 U/L (ref 15–37)
BILIRUB DIRECT SERPL-MCNC: 19.5 MG/DL (ref 0–0.3)
BILIRUB SERPL-MCNC: 26.8 MG/DL (ref 0.2–1)
BUN SERPL-MCNC: 51 MG/DL (ref 7–18)
CALCIUM SERPL-MCNC: 8.7 MG/DL (ref 8.5–10.1)
CHLORIDE SERPL-SCNC: 102 MMOL/L (ref 98–107)
CHOLEST SERPL-MCNC: < 50 MG/DL (ref ?–200)
CO2 SERPL-SCNC: 17 MMOL/L (ref 21–32)
CREAT SERPL-MCNC: 2.5 MG/DL (ref 0.55–1.3)
ERYTHROCYTE [DISTWIDTH] IN BLOOD BY AUTOMATED COUNT: 24 % (ref 11.6–14.8)
ERYTHROCYTE [DISTWIDTH] IN BLOOD BY AUTOMATED COUNT: 24.4 % (ref 11.6–14.8)
ERYTHROCYTE [DISTWIDTH] IN BLOOD BY AUTOMATED COUNT: 25.1 % (ref 11.6–14.8)
GLOBULIN SER-MCNC: 5.2 G/DL
HCT VFR BLD CALC: 17.8 % (ref 42–52)
HCT VFR BLD CALC: 30.4 % (ref 42–52)
HCT VFR BLD CALC: 32.5 % (ref 42–52)
HDLC SERPL-MCNC: 6 MG/DL (ref 40–60)
HGB BLD-MCNC: 5.2 G/DL (ref 14.2–18)
HGB BLD-MCNC: 9.3 G/DL (ref 14.2–18)
HGB BLD-MCNC: 9.6 G/DL (ref 14.2–18)
INR PPP: 2.2 (ref 0.9–1.1)
MCV RBC AUTO: 102 FL (ref 80–99)
MCV RBC AUTO: 104 FL (ref 80–99)
MCV RBC AUTO: 108 FL (ref 80–99)
PLATELET # BLD: 113 K/UL (ref 150–450)
PLATELET # BLD: 120 K/UL (ref 150–450)
PLATELET # BLD: 71 K/UL (ref 150–450)
POTASSIUM SERPL-SCNC: 5.2 MMOL/L (ref 3.5–5.1)
RBC # BLD AUTO: 1.65 M/UL (ref 4.7–6.1)
RBC # BLD AUTO: 2.99 M/UL (ref 4.7–6.1)
RBC # BLD AUTO: 3.12 M/UL (ref 4.7–6.1)
SODIUM SERPL-SCNC: 135 MMOL/L (ref 136–145)
TRIGL SERPL-MCNC: 134 MG/DL (ref 30–150)
WBC # BLD AUTO: 15.9 K/UL (ref 4.8–10.8)
WBC # BLD AUTO: 22.6 K/UL (ref 4.8–10.8)
WBC # BLD AUTO: 24.4 K/UL (ref 4.8–10.8)

## 2021-02-17 PROCEDURE — 0BH17EZ INSERTION OF ENDOTRACHEAL AIRWAY INTO TRACHEA, VIA NATURAL OR ARTIFICIAL OPENING: ICD-10-PCS

## 2021-02-17 PROCEDURE — 5A1945Z RESPIRATORY VENTILATION, 24-96 CONSECUTIVE HOURS: ICD-10-PCS

## 2021-02-17 PROCEDURE — 30233N1 TRANSFUSION OF NONAUTOLOGOUS RED BLOOD CELLS INTO PERIPHERAL VEIN, PERCUTANEOUS APPROACH: ICD-10-PCS

## 2021-02-17 PROCEDURE — 05HM33Z INSERTION OF INFUSION DEVICE INTO RIGHT INTERNAL JUGULAR VEIN, PERCUTANEOUS APPROACH: ICD-10-PCS

## 2021-02-17 RX ADMIN — INSULIN ASPART SCH UNITS: 100 INJECTION, SOLUTION INTRAVENOUS; SUBCUTANEOUS at 16:30

## 2021-02-17 RX ADMIN — DEXTROSE MONOHYDRATE SCH MLS/HR: 50 INJECTION, SOLUTION INTRAVENOUS at 22:00

## 2021-02-17 RX ADMIN — INSULIN ASPART SCH UNITS: 100 INJECTION, SOLUTION INTRAVENOUS; SUBCUTANEOUS at 21:00

## 2021-02-17 RX ADMIN — PHENYLEPHRINE HYDROCHLORIDE SCH MLS/HR: 10 INJECTION INTRAVENOUS at 18:42

## 2021-02-17 RX ADMIN — LACTULOSE SCH GM: 20 SOLUTION ORAL at 08:51

## 2021-02-17 RX ADMIN — DEXTROSE MONOHYDRATE SCH MLS/HR: 50 INJECTION, SOLUTION INTRAVENOUS at 11:24

## 2021-02-17 RX ADMIN — Medication SCH MLS/HR: at 18:21

## 2021-02-17 RX ADMIN — Medication SCH MLS/HR: at 15:30

## 2021-02-17 RX ADMIN — LACTULOSE SCH GM: 20 SOLUTION ORAL at 18:00

## 2021-02-17 RX ADMIN — DOCUSATE SODIUM SCH MG: 100 CAPSULE, LIQUID FILLED ORAL at 08:28

## 2021-02-17 RX ADMIN — INSULIN ASPART SCH UNITS: 100 INJECTION, SOLUTION INTRAVENOUS; SUBCUTANEOUS at 11:30

## 2021-02-17 RX ADMIN — LACTULOSE SCH GM: 20 SOLUTION ORAL at 13:00

## 2021-02-17 RX ADMIN — SODIUM BICARBONATE SCH MLS/HR: 84 INJECTION, SOLUTION INTRAVENOUS at 17:56

## 2021-02-17 NOTE — NUR
NURSE HAND-OFF REPORT: 



Important Events on Shift: Pt switched to A-fib w/ RVR and applied 4L via NC for 
supplemental oxygen to keep O2 >92%.

Patient Status: 

Diet: 



Pending Orders: 

Pending Results/Labs:

Pending MD notification:



Latest Vital Signs: Temperature 97.1 , Pulse 145 , B/P 105 /57 , Respiratory Rate 18 , O2 
SAT 96 , Nasal Cannula, O2 Flow Rate 2.0 .  

Vital Sign Comment: 



EKG Rhythm: Sinus Tachycardia

Rhythm change?: FRANCOISE LLOYD Notified?: FRANCOISE Harper MD Response: Message left await call



Latest Espinoza Fall Score: 45  

Fall Risk: High Risk 

Safety Measures: Call light , Bed Alarm , Side Rails Side Rails x3, Bed position Low and 
Locked.

Fall Precautions: 



Report given to Tahir

## 2021-02-17 NOTE — INFECTIOUS DISEASES PROG NOTE
Assessment/Plan


Assessment/Plan





Full consult dictated:





sepsis,


uti


liver ca


leukocytosis


? pna


vancomycin and zosyn


f/u on cultures, labs and chest x-ray


covid testing


poor prognosis





Subjective


Allergies:  


Coded Allergies:  


     CEPHALEXIN (Verified  Allergy, Unknown, 2/16/21)





Objective





Last 24 Hour Vital Signs








  Date Time  Temp Pulse Resp B/P (MAP) Pulse Ox O2 Delivery O2 Flow Rate FiO2


 


2/17/21 18:42  85  73/13    


 


2/17/21 18:21    65/25    


 


2/17/21 17:25        80


 


2/17/21 16:05  112 20     100


 


2/17/21 15:30    43/26    


 


2/17/21 14:53  50 18  100   100


 


2/17/21 14:53  50 18  94 Bi-Pap  100


 


2/17/21 12:00  97      


 


2/17/21 12:00 96.1 94 20 101/44 (63) 4   


 


2/17/21 09:00      Room Air  


 


2/17/21 08:29  134  114/63    


 


2/17/21 08:00 96.6 134 20 114/63 (80) 99   


 


2/17/21 08:00  148      


 


2/17/21 07:24  136      


 


2/17/21 04:56 97.1 145 18 105/57 (73) 96   


 


2/17/21 04:00  137      


 


2/17/21 04:00 97.6 130 20 138/85 (102) 99   


 


2/17/21 00:00  69      


 


2/17/21 00:00 98.8 69 18 130/76 (94) 96   


 


2/16/21 21:00      Room Air  


 


2/16/21 20:00 97.9 65 18 126/73 (90) 96   


 


2/16/21 20:00  64      








Height (Feet):  5


Height (Inches):  7.00


Weight (Pounds):  175





Microbiology








 Date/Time


Source Procedure


Growth Status





 


 2/16/21 14:24


Urine,Clean Catch Urine Culture - Preliminary


NO GROWTH Resulted











Laboratory Tests








Test


 2/17/21


05:43 2/17/21


08:40 2/17/21


13:21 2/17/21


17:09


 


White Blood Count


 24.4 K/UL


(4.8-10.8)  #*H 22.6 K/UL


(4.8-10.8)  *H 


 





 


Red Blood Count


 2.99 M/UL


(4.70-6.10)  L 3.12 M/UL


(4.70-6.10)  L 


 





 


Hemoglobin


 9.3 G/DL


(14.2-18.0)  L 9.6 G/DL


(14.2-18.0)  L 


 





 


Hematocrit


 30.4 %


(42.0-52.0)  L 32.5 %


(42.0-52.0)  L 


 





 


Mean Corpuscular Volume


 102 FL (80-99)


H 104 FL (80-99)


H 


 





 


Mean Corpuscular Hemoglobin


 31.2 PG


(27.0-31.0)  H 30.6 PG


(27.0-31.0) 


 





 


Mean Corpuscular Hemoglobin


Concent 30.6 G/DL


(32.0-36.0)  L 29.4 G/DL


(32.0-36.0)  L 


 





 


Red Cell Distribution Width


 24.0 %


(11.6-14.8)  H 24.4 %


(11.6-14.8)  H 


 





 


Platelet Count


 113 K/UL


(150-450)  L 120 K/UL


(150-450)  L 


 





 


Mean Platelet Volume


 8.6 FL


(6.5-10.1) 9.4 FL


(6.5-10.1) 


 





 


Neutrophils (%) (Auto)


 % (45.0-75.0)


 % (45.0-75.0)


 


 





 


Lymphocytes (%) (Auto)


 % (20.0-45.0)


 % (20.0-45.0)


 


 





 


Monocytes (%) (Auto)  % (1.0-10.0)    % (1.0-10.0)    


 


Eosinophils (%) (Auto)  % (0.0-3.0)    % (0.0-3.0)    


 


Basophils (%) (Auto)  % (0.0-2.0)    % (0.0-2.0)    


 


Differential Total Cells


Counted 100  


 100  


 


 





 


Neutrophils % (Manual) 85 % (45-75)  H 84 % (45-75)  H  


 


Lymphocytes % (Manual) 4 % (20-45)  L 4 % (20-45)  L  


 


Monocytes % (Manual) 9 % (1-10)   10 % (1-10)    


 


Eosinophils % (Manual) 0 % (0-3)   0 % (0-3)    


 


Basophils % (Manual) 0 % (0-2)   0 % (0-2)    


 


Band Neutrophils 2 % (0-8)   2 % (0-8)    


 


Platelet Estimate Decreased  L Decreased  L  


 


Platelet Morphology Normal   Normal    


 


Hypochromasia 1+   1+    


 


Anisocytosis 2+   2+    


 


Macrocytosis 1+   1+    


 


Erythrocyte Sedimentation Rate


 101 MM/HR


(0-20)  H 


 


 





 


Prothrombin Time


 23.0 SEC


(9.30-11.50)  H 


 


 





 


Prothromb Time International


Ratio 2.2 (0.9-1.1)


H 


 


 





 


Activated Partial


Thromboplast Time 52 SEC (23-33)


H 


 


 





 


D-Dimer


 21.56 mg/L FEU


(0.00-0.49)  H 


 


 





 


Sodium Level


 135 MMOL/L


(136-145)  L 


 


 





 


Potassium Level


 5.2 MMOL/L


(3.5-5.1)  H 


 


 





 


Chloride Level


 102 MMOL/L


() 


 


 





 


Carbon Dioxide Level


 17 MMOL/L


(21-32)  L 


 


 





 


Anion Gap


 16 mmol/L


(5-15)  H 


 


 





 


Blood Urea Nitrogen


 51 mg/dL


(7-18)  H 


 


 





 


Creatinine


 2.5 MG/DL


(0.55-1.30)  H 


 


 





 


Estimat Glomerular Filtration


Rate 26.7 mL/min


(>60) 


 


 





 


Glucose Level


 73 MG/DL


()  L 


 


 





 


Hemoglobin A1c


 4.9 %


(4.3-6.0) 5.3 %


(4.3-6.0) 


 





 


Lactic Acid Level


 6.30 mmol/L


(0.4-2.0)  H 7.80 mmol/L


(0.66-2.22)  H 


 





 


Calcium Level


 8.7 MG/DL


(8.5-10.1) 


 


 





 


Total Bilirubin


 26.8 MG/DL


(0.2-1.0)  H 


 


 





 


Direct Bilirubin


 19.5 MG/DL


(0.0-0.3)  H 


 


 





 


Aspartate Amino Transf


(AST/SGOT) 204 U/L


(15-37)  H 


 


 





 


Alanine Aminotransferase


(ALT/SGPT) 80 U/L (12-78)


H 


 


 





 


Alkaline Phosphatase


 659 U/L


()  H 


 


 





 


C-Reactive Protein,


Quantitative 12.6 mg/dL


(0.00-0.90)  H 


 


 





 


Pro-B-Type Natriuretic Peptide


 2317 pg/mL


(0-125)  H 


 


 





 


Total Protein


 7.1 G/DL


(6.4-8.2) 


 


 





 


Albumin


 1.9 G/DL


(3.4-5.0)  L 


 


 





 


Globulin 5.2 g/dL     


 


Albumin/Globulin Ratio


 0.4 (1.0-2.7)


L 


 


 





 


Prealbumin Pending     


 


Triglycerides Level


 134 MG/DL


() 


 


 





 


Cholesterol Level


 < 50 MG/DL (<


200) 


 


 





 


LDL Cholesterol


 23 mg/dL


(<100) 


 


 





 


HDL Cholesterol


 6 MG/DL


(40-60)  L 


 


 





 


Cholesterol/HDL Ratio  (3.3-4.4)     


 


Amylase Level


 1661 U/L


()  *H 


 


 





 


Lipase


 > 2000 U/L


()  H 


 


 





 


Thyroid Stimulating Hormone


(TSH) 4.146 uiU/mL


(0.358-3.740) 


 


 





 


Arterial Blood pH


 


 


 7.063


(7.350-7.450) 7.073


(7.350-7.450)


 


Arterial Blood Partial


Pressure CO2 


 


 43.5 mmHg


(35.0-45.0) 36.9 mmHg


(35.0-45.0)


 


Arterial Blood Partial


Pressure O2 


 


 81.2 mmHg


(75.0-100.0) 212.1 mmHg


(75.0-100.0)  H


 


Arterial Blood HCO3


 


 


 12.1 mmol/L


(22.0-26.0)  *L 10.5 mmol/L


(22.0-26.0)  *L


 


Arterial Blood Oxygen


Saturation 


 


 91.4 %


()  L 99.6 %


()


 


Arterial Blood Base Excess


 


 


 -17.0 (-2-2)


*L -18.1 (-2-2)


*L


 


Justin Test   Positive   Positive  











Current Medications








 Medications


  (Trade)  Dose


 Ordered  Sig/Marleen


 Route


 PRN Reason  Start Time


 Stop Time Status Last Admin


Dose Admin


 


 Chlorhexidine


 Gluconate


  (Nivia-Hex 2%)  1 applic  DAILY@2000


 TOPIC


   2/17/21 20:00


 5/18/21 19:59   





 


 Dextrose


  (Dextrose 50%)  25 ml  Q30M  PRN


 IV


 Hypoglycemia  2/17/21 07:30


 5/18/21 07:29   





 


 Dextrose


  (Dextrose 50%)  50 ml  Q30M  PRN


 IV


 Hypoglycemia  2/17/21 07:30


 5/18/21 07:29  2/17/21 11:42





 


 Diphenhydramine


 HCl


  (Benadryl)  25 mg  Q6H  PRN


 ORAL


 Itching/Pruritis  2/16/21 16:45


 3/18/21 16:44   





 


 Famotidine


  (Pepcid I.v.)  20 mg  Q12HR


 IVP


   2/17/21 16:15


 3/19/21 16:14   





 


 Insulin Aspart


  (NovoLOG)    BEFORE MEALS AND  HS


 SUBQ


   2/17/21 11:30


 5/18/21 11:29   





 


 Lactulose


  (Cephulac)  20 gm  THREE TIMES A  DAY


 ORAL


   2/17/21 09:00


 3/19/21 08:59  2/17/21 08:51





 


 Lorazepam


  (Ativan)  1 mg  Q4H  PRN


 ORAL


 For Anxiety  2/16/21 16:45


 2/23/21 16:44  2/17/21 03:40





 


 Metoprolol


 Tartrate


  (Lopressor)  25 mg  Q12HR


 ORAL


   2/17/21 09:00


 5/18/21 08:59  2/17/21 08:29





 


 Morphine Sulfate


  (Morphine


 Sulfate)  2 mg  Q3H  PRN


 IVP


 Moderate Pain (Pain Scale 4-6)  2/16/21 16:45


 2/23/21 16:44  2/17/21 02:59





 


 Norepinephrine


 Bitartrate  250 ml @ 


 15 mls/hr  Q24H


 IV


   2/17/21 15:28


 2/20/21 15:27  2/17/21 18:21





 


 Ondansetron HCl


  (Zofran)  4 mg  Q6H  PRN


 IVP


 Nausea & Vomiting  2/16/21 16:45


 3/18/21 16:44   





 


 Pantoprazole


  (Protonix)  40 mg  EVERY 12  HOURS


 ORAL


   2/17/21 21:00


 3/19/21 20:59   





 


 Phenylephrine HCl


 100 mg/Dextrose  250 ml @ 0


 mls/hr  Q24H


 IV


   2/17/21 18:00


 2/20/21 17:59  2/17/21 18:42





 


 Piperacillin Sod/


 Tazobactam Sod


 3.375 gm/Sodium


 Chloride  110 ml @ 


 27.5 mls/hr  EVERY 8  HOURS


 IVPB


   2/17/21 10:00


 2/22/21 09:59  2/17/21 11:24





 


 Rifaximin


  (Xifaxan)  550 mg  EVERY 12  HOURS


 ORAL


   2/17/21 09:00


 2/24/21 08:59  2/17/21 08:51





 


 Sodium


 Bicarbonate 100


 ml/Dextrose/


 Sodium Chloride  1,100 ml @ 


 75 mls/hr  [id number]


 IV


   2/17/21 17:00


 3/19/21 16:59  2/17/21 17:56





 


 Temazepam


  (Restoril)  15 mg  DAILYPRN  PRN


 ORAL


 Insomnia  2/16/21 16:45


 2/23/21 16:44   





 


 Vancomycin HCl


  ([facility] pharmacy


 to dose)  1 ea  DAILY  PRN


 MISC


 Per rx protocol  2/17/21 08:00


 3/19/21 07:59   




















Gina Acosta MD          Feb 17, 2021 18:48

## 2021-02-17 NOTE — NUR
NURSE NOTES:

Dr. Harper notified of WBC 24.4. Received orders to draw Blood cultures x2, reflex UA, start 
on Vanco per pharmacy and IV Zosyn 3.375g q8hrs. Notified of Keflex allergy, MD is aware and 
wishes to continue with Zosyn. Orders noted and carried out.

## 2021-02-17 NOTE — NUR
NURSE NOTES:

Seen by Dr. Briggs and assessed patient. EKG done and informed Dr. Briggs. Ordered Yuri 
for SBP>90. Will continue plan of care.

## 2021-02-17 NOTE — NUR
NURSE NOTES:

Received report from LEONOR Roland. Pt is A/O x1-2 and verbally responsive. Pt is Turkish 
speaking with simple English. Cardiac monitor shows Sinus Rhythm at 89. No SOB or acute 
distress. Pain noted in epigastric area but subsided to minimal because of the medication 
given in ED. Will continue to follow up regarding medication needed. Pt is noted with 2+ 
edema. EJ 22G noted on the left side with NS running @ 50cc/hr. Will continue plan of care.

## 2021-02-17 NOTE — CONSULTATION
Consult Note


Consult Note


Asked to evaluate the patient at the request of Dr. Harper for renal failure


Patient seen earlier in the ICU





Chief Complaint:  Abdominal Pain





Patient presents complaining of total body pain.  He is also jaundiced.  

Apparently was recently discharged from the hospital.  The pain throughout his 

body is severe.  He was discharged on Dilaudid.  Unknown whether he has been 

receiving this medication.





He has a history of liver cancer.  There is a suggestion that he was discharged 

to hospice.  There is no clear documentation of this.  However family contacted 

his private doctor and requested repeat evaluation and therefore he was brought 

to the emergency department.


History of diabetes, hypertension and cirrhosis.





Patient denies fevers or chills.





Review of outside records reveal that he has had atrial fibrillation with rapid 

ventricular rate.


Patient denies dysuria.  He has had loose stools without passing any blood or 

melena.  He is felt nauseated but denies vomiting.  There is no coffee-ground or

bloody vomitus.


The patient has a nonproductive cough.  He does not feel short of breath at this

time.


Allergies:  CEPHALEXIN (Verified  Allergy, Unknown, 2/16/21)





COVID-19 Screening


Contact w/high risk pt:  No


Experienced COVID-19 symptoms?:  No


COVID-19 Testing performed PTA:  No








Past Medical History:  see triage record, other - Varices, liver mass


Past Surgical History:  other - shunt


Social History:  Denies: smoking





Past Medical History:  No History, Except For


Hx Hypertension:  Yes


Hx Diabetes:  Yes


Hx Cancer:  Yes - liver


Hx Gastrointestinal Problems:  Yes - liver cirrosis











Vital Signs








  Date Time  Temp Pulse Resp B/P (MAP) Pulse Ox O2 Delivery O2 Flow Rate FiO2


 


2/16/21 10:40 97.0 56 14 129/58 (81) 96 Room Air  








Assessment/Plan





Renal failure most likely secondary to liver failure


Liver failure, liver mass 


jaundice, pancreatitis, coagulopathy


Atrial fibrillation


UTI


Pleural effusion


Leukocytosis














Graves catheter


Urine studies


IV fluid with bicarb


Lactulose


IV Protonix 


Monitor renal parameters


Per orders











Alfred Watts MD            Feb 17, 2021 15:45

## 2021-02-17 NOTE — CONSULTATION
DATE OF CONSULTATION:  02/17/2021

CHIEF COMPLAINT:  Altered mental status, cirrhosis, liver disease.



HISTORY OF PRESENT ILLNESS:  Most of history per chart.  The patient is

alert, not confused, not able to give any history.  This is a 60-year-old

male with a history of liver cirrhosis, possibly liver cancer, admitted

also with complaint of abdominal pain, abdominal swelling, so far since

admission he was found to be in active atrial fibrillation, also has

possible liver failure.



PAST MEDICAL HISTORY:  Unable to obtain, but per chart, the patient has a

history of hypertension, cirrhosis, possible liver mass.



ALLERGIES:  To cephalexin.



MEDICATIONS:  Please see medication reconciliation list.



SOCIAL HISTORY:  Unable to obtain.



FAMILY HISTORY:  Unable to obtain.



PAST SURGICAL HISTORY:  Based on CT, there is some surgery on the liver but

we are not sure exactly what kind.



PHYSICAL EXAMINATION:

VITAL SIGNS:  Temperature is 97.1, pulse 134, respiration is 18, blood

pressure 105/57.

HEENT:  Normocephalic and atraumatic.  Sclerae are icterus.

NECK:  Supple.  No evidence of obvious lymphadenopathy.

CARDIOVASCULAR:  Irregularly irregular.  Plus S1, S2.

LUNGS:  Decreased breath sounds bilaterally diffusely based on supine

exam.

ABDOMEN:  Soft and nontender.  Mildly distended.  Bowel sounds are

hypoactive.

EXTREMITIES:  Bilateral lower extremity edema.

NEUROLOGIC:  Unable to obtain.  The patient is obtunded.



LABORATORY DATA:  White count is 24,000, hemoglobin 9.3, hematocrit 30, MCV

of 102, platelet count is 113.  INR is 2.2.  Chem-7, sodium 133, potassium

4.6, BUN 45, creatinine is 1.8.  Bilirubin is 26, direct 18, AST of 180,

ALT 74, alkaline phosphatase of 634.  Lipase of 2000.



ASSESSMENT:  This is a 60-year-old male with liver disease, possible liver

mass complicated with portal hypertension, esophageal and gastric varices,

ascites, hypoalbuminemia, coagulopathy, possible encephalopathy with

ammonia level of 71.



PLAN:  Discontinue IV heparin given the patient has low platelet count, has

esophageal varices, and if he bleeds that would be a disaster for him.  We

will recommend Cardiology consultation for management of atrial

fibrillation and avoid anticoagulation if it is possible.  We are actually

going to give him vitamin K 1 dose to reverse the coagulopathy.  The

patient to be started on lactulose _____ t.i.d. and Xifaxan for

encephalopathy.  Ammonia will be monitored.  Abdominal ultrasound.

Consider paracentesis if needed.  Add albumin daily.  The patient overall

has a very poor prognosis.



I want to thank, Dr. Jeevan Harper for this kind referral.









  ______________________________________________

  Shaq Linder M.D.





DR:  Shiloh

D:  02/17/2021 08:40

T:  02/17/2021 12:59

JOB#:  [id number]

CC:  Jeevan Harper M.D.; Fax#:  [phone]

## 2021-02-17 NOTE — DIAGNOSTIC IMAGING REPORT
Indication: Post intubation

 

Technique: One view of the chest

 

Comparison: 2/16/2021

 

Findings: Patient is rotated to the left. Large right pleural effusion again

demonstrated. Borderline cardiomegaly. Mild interstitial congestion again noted.

Interim endotracheal intubation, endotracheal tube tip projecting approximately 3 cm

above the constance in good position. Interim right jugular central venous catheter

placement, tip projected at the level of the mid superior vena cava. The heart

remains enlarged.

 

Impression: Satisfactory endotracheal intubation

 

Right jugular central line in good position. No radiographically evident

complication.

 

Large right pleural effusion also previously demonstrated

## 2021-02-17 NOTE — NUR
NURSE NOTES:

With pt still having 140-150 HR and awaiting new orders. House supervisor came to the floor 
and suspected A-fib. EKG taken twice 5 minutes apart in which first one showed Aflutter and 
then the second showed A-fib w/ RVR. Dr. Harper additionally contacted and notified. Awaiting 
new orders.

## 2021-02-17 NOTE — CONSULTATION
DATE OF CONSULTATION:  02/17/2021

INFECTIOUS DISEASE CONSULT



ATTENDING PHYSICIAN:  Jeevan Harper M.D.



REFERRING PHYSICIAN:  Jeevan Harper M.D.



REASON FOR CONSULTATION:  Sepsis, elevated white count, UTI, liver failure,

pancreatitis, possible pneumonia.  The patient's chief complaint coming to

the hospital is liver failure, respiratory distress, failure, UTI.



HISTORY OF PRESENT ILLNESS:  This is a 60-year-old male who comes into

[facility] with multiple issues including respiratory failure,

pancreatitis, liver failure, liver cancer, UTI.  The patient currently is

intubated on a vent.  Infectious disease consultation is requested for

antibiotic management.  The patient is septic with elevated white count.

The patient is on vancomycin and Zosyn.  The patient cannot add to his

history.  The patient is not on pressors currently, but is on a vent.

Case discussed with RN at the bedside in the ICU.



REVIEW OF SYSTEMS:  The patient could not add to review of systems.  He is

sedated, on a vent.  He is not on pressors currently, he is in respiratory

failure.  CARDIAC:  No pressors.  GASTROINTESTINAL:  No nausea, vomiting,

diarrhea.  GENITOURINARY:  He has no Graves currently.  They will place a

Graves, I believe.  PULMONARY:  On a vent.

Rest of review of systems is limited.



PAST MEDICAL HISTORY:  The patient has a past medical history of

hypertension, history of hypercholesteremia or dyslipidemia or

hyperlipidemia, history of liver failure, liver cancer.  He has a history

of renal abscess in the past and previous hospitalizations, ascites,

jaundice.  He has pancreatitis.  He has a history of cirrhosis, atrial

fibrillation, perinephric abscess, UTI, pyelonephritis.



ALLERGIES:  He has allergies to cephalexin, tolerating Zosyn currently.



SOCIAL HISTORY:  Negative for smoking, alcohol, or drug abuse.



FAMILY HISTORY:  Noncontributory.



MEDICATIONS:  Noted and reconciled.  He is currently on Zosyn, vancomycin.

He is on phenylephrine, pantoprazole, famotidine, IV fluids, metoprolol,

lactulose, rifaximin.  Outside medications were noted and reconciled.



PHYSICAL EXAMINATION:

VITAL SIGNS:  Temperature 96.1, pulse rate 112, respiratory rate 20, blood

pressure 65/25, FiO2 80%, saturation 94%.  He is now on a ventilator.

HEAD AND NECK:  Orally intubated.  Normocephalic.

HEART:  Regular.  No gallop or murmur.

ABDOMEN:  Looks like somewhat distended.

LUNGS:  Bilateral rhonchi and possible rales.

SKIN:  No rash.

NEUROLOGIC:  Sedated.

GENITOURINARY:  No Graves currently.

EXTREMITIES:  No cellulitis in the extremities.



LABORATORY DATA:  Creatinine 2.5. Sodium 135, total bilirubin 26.8. LFTs,

AST is 204, ALT is 80, total bilirubin 26.8. C-reactive protein 12.6.

Amylase is 1661, lipase is greater than 2000. White count 22.6, hemoglobin

9.6, platelet count 120. Urinalysis had rmp-enfe-gc-count white blood

cells, positive nitrite. Cultures, urine culture negative to date. Blood

cultures are pending. COVID testing is pending.



IMAGING STUDIES:  Chest x-ray showed large right effusion.  Abdominal

ultrasound was limited.  Nonvisualization of the gallbladder.  CT scan of

the abdomen and pelvis showed hepatic cirrhosis, mass consistent with

liver cancer, probably related with history of liver cancer, portal

hypertension, ascites.  Report was noted.



ASSESSMENT AND PLAN:

1. The patient has sepsis, elevated white count, UTI, possible

aspiration, healthcare-acquired pneumonia versus community-acquired

pneumonia.  The patient has liver failure.  The patient has pancreatitis.

The patient has liver cancer.  The patient has ascites.  The patient has

history of perinephric abscess and pyelonephritis.  UA is positive, looks

like UTI, but urine culture is negative.  The patient's prognosis is

extremely poor.  At this time, we will continue vancomycin and Zosyn for

sepsis, elevated white count, UTI, possible pneumonia, and possible GI

sepsis.  Check cultures, labs, chest x-ray.  Again, prognosis is poor.

2. COVID testing was done.

3. Anemia.

4. Liver failure.

5. Cirrhosis.

6. Pancreatitis.  Continue supportive care.

7. Renal failure.

8. History of hypertension.

9. History of dyslipidemia.

10. Poor prognosis.

11. Continue treatment per primary consultants.

12. Allergies to cephalexin, tolerated Zosyn.

13. Social history is negative.

14. Family history is noncontributory.

15. MAR is noted.

16. Discussed case with _____.









  ______________________________________________

  Gina Acosta M.D.





DR:  VERONICA

D:  02/17/2021 19:01

T:  02/17/2021 19:48

JOB#:  [id number]

CC:

## 2021-02-17 NOTE — CARDIOLOGY PROGRESS NOTE
Assessment/Plan


Assessment/Plan


[id number]


nwo in sinus


paf 


cva hx  


septic / hypotesnive 


lactic acidosis 


pleural effusion 


ascites 


renal isnuf 


jaundice 


hcc 


cirrhosis 











addition of pressor 


iv abx 


covid testig 


d/w id and rn 


ekg reviewed 


not a candiate for amiod 


metorpolol for hr controel dig for hr control 


poor prognosis





Objective





Last 24 Hour Vital Signs








  Date Time  Temp Pulse Resp B/P (MAP) Pulse Ox O2 Delivery O2 Flow Rate FiO2


 


2/17/21 16:05  112 20     100


 


2/17/21 15:30    43/26    


 


2/17/21 14:53  50 18  100   100


 


2/17/21 14:53  50 18  94 Bi-Pap  100


 


2/17/21 12:00  97      


 


2/17/21 12:00 96.1 94 20 101/44 (63) 4   


 


2/17/21 09:00      Room Air  


 


2/17/21 08:29  134  114/63    


 


2/17/21 08:00 96.6 134 20 114/63 (80) 99   


 


2/17/21 08:00  148      


 


2/17/21 07:24  136      


 


2/17/21 04:56 97.1 145 18 105/57 (73) 96   


 


2/17/21 04:00  137      


 


2/17/21 04:00 97.6 130 20 138/85 (102) 99   


 


2/17/21 00:00  69      


 


2/17/21 00:00 98.8 69 18 130/76 (94) 96   


 


2/16/21 21:00      Room Air  


 


2/16/21 20:00 97.9 65 18 126/73 (90) 96   


 


2/16/21 20:00  64      

















Intake and Output  


 


 2/16/21 2/17/21





 19:00 07:00


 


Intake Total  550 ml


 


Balance  550 ml


 


  


 


Intake Oral  0 ml


 


IV Total  550 ml


 


# Voids  1











Laboratory Tests








Test


 2/17/21


05:43 2/17/21


08:40 2/17/21


13:21 2/17/21


17:09


 


White Blood Count


 24.4 K/UL


(4.8-10.8)  #*H 22.6 K/UL


(4.8-10.8)  *H 


 





 


Red Blood Count


 2.99 M/UL


(4.70-6.10)  L 3.12 M/UL


(4.70-6.10)  L 


 





 


Hemoglobin


 9.3 G/DL


(14.2-18.0)  L 9.6 G/DL


(14.2-18.0)  L 


 





 


Hematocrit


 30.4 %


(42.0-52.0)  L 32.5 %


(42.0-52.0)  L 


 





 


Mean Corpuscular Volume


 102 FL (80-99)


H 104 FL (80-99)


H 


 





 


Mean Corpuscular Hemoglobin


 31.2 PG


(27.0-31.0)  H 30.6 PG


(27.0-31.0) 


 





 


Mean Corpuscular Hemoglobin


Concent 30.6 G/DL


(32.0-36.0)  L 29.4 G/DL


(32.0-36.0)  L 


 





 


Red Cell Distribution Width


 24.0 %


(11.6-14.8)  H 24.4 %


(11.6-14.8)  H 


 





 


Platelet Count


 113 K/UL


(150-450)  L 120 K/UL


(150-450)  L 


 





 


Mean Platelet Volume


 8.6 FL


(6.5-10.1) 9.4 FL


(6.5-10.1) 


 





 


Neutrophils (%) (Auto)


 % (45.0-75.0)


 % (45.0-75.0)


 


 





 


Lymphocytes (%) (Auto)


 % (20.0-45.0)


 % (20.0-45.0)


 


 





 


Monocytes (%) (Auto)  % (1.0-10.0)    % (1.0-10.0)    


 


Eosinophils (%) (Auto)  % (0.0-3.0)    % (0.0-3.0)    


 


Basophils (%) (Auto)  % (0.0-2.0)    % (0.0-2.0)    


 


Differential Total Cells


Counted 100  


 100  


 


 





 


Neutrophils % (Manual) 85 % (45-75)  H 84 % (45-75)  H  


 


Lymphocytes % (Manual) 4 % (20-45)  L 4 % (20-45)  L  


 


Monocytes % (Manual) 9 % (1-10)   10 % (1-10)    


 


Eosinophils % (Manual) 0 % (0-3)   0 % (0-3)    


 


Basophils % (Manual) 0 % (0-2)   0 % (0-2)    


 


Band Neutrophils 2 % (0-8)   2 % (0-8)    


 


Platelet Estimate Decreased  L Decreased  L  


 


Platelet Morphology Normal   Normal    


 


Hypochromasia 1+   1+    


 


Anisocytosis 2+   2+    


 


Macrocytosis 1+   1+    


 


Erythrocyte Sedimentation Rate


 101 MM/HR


(0-20)  H 


 


 





 


Prothrombin Time


 23.0 SEC


(9.30-11.50)  H 


 


 





 


Prothromb Time International


Ratio 2.2 (0.9-1.1)


H 


 


 





 


Activated Partial


Thromboplast Time 52 SEC (23-33)


H 


 


 





 


D-Dimer


 21.56 mg/L FEU


(0.00-0.49)  H 


 


 





 


Sodium Level


 135 MMOL/L


(136-145)  L 


 


 





 


Potassium Level


 5.2 MMOL/L


(3.5-5.1)  H 


 


 





 


Chloride Level


 102 MMOL/L


() 


 


 





 


Carbon Dioxide Level


 17 MMOL/L


(21-32)  L 


 


 





 


Anion Gap


 16 mmol/L


(5-15)  H 


 


 





 


Blood Urea Nitrogen


 51 mg/dL


(7-18)  H 


 


 





 


Creatinine


 2.5 MG/DL


(0.55-1.30)  H 


 


 





 


Estimat Glomerular Filtration


Rate 26.7 mL/min


(>60) 


 


 





 


Glucose Level


 73 MG/DL


()  L 


 


 





 


Hemoglobin A1c


 4.9 %


(4.3-6.0) 5.3 %


(4.3-6.0) 


 





 


Lactic Acid Level


 6.30 mmol/L


(0.4-2.0)  H 7.80 mmol/L


(0.66-2.22)  H 


 





 


Calcium Level


 8.7 MG/DL


(8.5-10.1) 


 


 





 


Total Bilirubin


 26.8 MG/DL


(0.2-1.0)  H 


 


 





 


Direct Bilirubin


 19.5 MG/DL


(0.0-0.3)  H 


 


 





 


Aspartate Amino Transf


(AST/SGOT) 204 U/L


(15-37)  H 


 


 





 


Alanine Aminotransferase


(ALT/SGPT) 80 U/L (12-78)


H 


 


 





 


Alkaline Phosphatase


 659 U/L


()  H 


 


 





 


C-Reactive Protein,


Quantitative 12.6 mg/dL


(0.00-0.90)  H 


 


 





 


Pro-B-Type Natriuretic Peptide


 2317 pg/mL


(0-125)  H 


 


 





 


Total Protein


 7.1 G/DL


(6.4-8.2) 


 


 





 


Albumin


 1.9 G/DL


(3.4-5.0)  L 


 


 





 


Globulin 5.2 g/dL     


 


Albumin/Globulin Ratio


 0.4 (1.0-2.7)


L 


 


 





 


Prealbumin Pending     


 


Triglycerides Level


 134 MG/DL


() 


 


 





 


Cholesterol Level


 < 50 MG/DL (<


200) 


 


 





 


LDL Cholesterol


 23 mg/dL


(<100) 


 


 





 


HDL Cholesterol


 6 MG/DL


(40-60)  L 


 


 





 


Cholesterol/HDL Ratio  (3.3-4.4)     


 


Amylase Level


 1661 U/L


()  *H 


 


 





 


Lipase


 > 2000 U/L


()  H 


 


 





 


Thyroid Stimulating Hormone


(TSH) 4.146 uiU/mL


(0.358-3.740) 


 


 





 


Arterial Blood pH


 


 


 7.063


(7.350-7.450) 7.073


(7.350-7.450)


 


Arterial Blood Partial


Pressure CO2 


 


 43.5 mmHg


(35.0-45.0) 36.9 mmHg


(35.0-45.0)


 


Arterial Blood Partial


Pressure O2 


 


 81.2 mmHg


(75.0-100.0) 212.1 mmHg


(75.0-100.0)  H


 


Arterial Blood HCO3


 


 


 12.1 mmol/L


(22.0-26.0)  *L 10.5 mmol/L


(22.0-26.0)  *L


 


Arterial Blood Oxygen


Saturation 


 


 91.4 %


()  L 99.6 %


()


 


Arterial Blood Base Excess


 


 


 -17.0 (-2-2)


*L -18.1 (-2-2)


*L


 


Justin Test   Positive   Positive  











Microbiology








 Date/Time


Source Procedure


Growth Status





 


 2/16/21 14:24


Urine,Clean Catch Urine Culture - Preliminary


NO GROWTH Resulted

















Eduardo Briggs MD          Feb 17, 2021 17:40

## 2021-02-17 NOTE — SURGERY PROGRESS NOTE
Surgery Progress Note


Subjective


Additional Comments


labs worse


ill appearing


no n/v





Objective





Last 24 Hour Vital Signs








  Date Time  Temp Pulse Resp B/P (MAP) Pulse Ox O2 Delivery O2 Flow Rate FiO2


 


2/17/21 09:00      Room Air  


 


2/17/21 08:29  134  114/63    


 


2/17/21 08:00 96.6 134 20 114/63 (80) 99   


 


2/17/21 08:00  148      


 


2/17/21 07:24  136      


 


2/17/21 04:56 97.1 145 18 105/57 (73) 96   


 


2/17/21 04:00  137      


 


2/17/21 04:00 97.6 130 20 138/85 (102) 99   


 


2/17/21 00:00  69      


 


2/17/21 00:00 98.8 69 18 130/76 (94) 96   


 


2/16/21 21:00      Room Air  


 


2/16/21 20:00 97.9 65 18 126/73 (90) 96   


 


2/16/21 20:00  64      


 


2/16/21 16:00 98.6 109 20 133/85 (101) 100   


 


2/16/21 16:00  117      


 


2/16/21 15:49      Room Air  


 


2/16/21 14:36  144  120/78    


 


2/16/21 13:11 97.0 72 17 119/52 100 Nasal Cannula 2.0 


 


2/16/21 13:11  64 16   Nasal Cannula 2.0 100


 


2/16/21 13:09  150 20   Room Air  








I&O











Intake and Output  


 


 2/16/21 2/17/21





 19:00 07:00


 


Intake Total  550 ml


 


Balance  550 ml


 


  


 


Intake Oral  0 ml


 


IV Total  550 ml


 


# Voids  1








Cardiovascular:  RSR


Respiratory:  decreased breath sounds


Abdomen:  soft, non-tender, decreased bowel sounds


Extremities:  edema, no tenderness, no cyanosis





Laboratory Tests








Test


 2/16/21


14:24 2/17/21


05:43 2/17/21


08:40


 


Urine Color Orange    


 


Urine Appearance Cloudy    


 


Urine pH 5 (4.5-8.0)    


 


Urine Specific Gravity


 1.020


(1.005-1.035) 


 





 


Urine Protein


 2+ (NEGATIVE)


H 


 





 


Urine Glucose (UA)


 1+ (NEGATIVE)


H 


 





 


Urine Ketones


 1+ (NEGATIVE)


H 


 





 


Urine Blood


 4+ (NEGATIVE)


H 


 





 


Urine Nitrite


 Positive


(NEGATIVE)  H 


 





 


Urine Bilirubin


 3+ (NEGATIVE)


H 


 





 


Urine Ictotest


 Positive


(NEGATIVE) 


 





 


Urine Urobilinogen


 8 MG/DL


(0.0-1.0)  H 


 





 


Urine Leukocyte Esterase


 3+ (NEGATIVE)


H 


 





 


Urine RBC


 2-4 /HPF (0 -


0)  H 


 





 


Urine WBC


 Tntc /HPF (0 -


0)  H 


 





 


Urine Squamous Epithelial


Cells Occasional


/LPF 


 





 


Urine Bacteria


 Many /HPF


(NONE)  H 


 





 


Urine Coarse Granular Casts


 0-2 /LPF


(NONE)  H 


 





 


White Blood Count


 


 24.4 K/UL


(4.8-10.8)  #*H 22.6 K/UL


(4.8-10.8)  *H


 


Red Blood Count


 


 2.99 M/UL


(4.70-6.10)  L 3.12 M/UL


(4.70-6.10)  L


 


Hemoglobin


 


 9.3 G/DL


(14.2-18.0)  L 9.6 G/DL


(14.2-18.0)  L


 


Hematocrit


 


 30.4 %


(42.0-52.0)  L 32.5 %


(42.0-52.0)  L


 


Mean Corpuscular Volume


 


 102 FL (80-99)


H 104 FL (80-99)


H


 


Mean Corpuscular Hemoglobin


 


 31.2 PG


(27.0-31.0)  H 30.6 PG


(27.0-31.0)


 


Mean Corpuscular Hemoglobin


Concent 


 30.6 G/DL


(32.0-36.0)  L 29.4 G/DL


(32.0-36.0)  L


 


Red Cell Distribution Width


 


 24.0 %


(11.6-14.8)  H 24.4 %


(11.6-14.8)  H


 


Platelet Count


 


 113 K/UL


(150-450)  L 120 K/UL


(150-450)  L


 


Mean Platelet Volume


 


 8.6 FL


(6.5-10.1) 9.4 FL


(6.5-10.1)


 


Neutrophils (%) (Auto)


 


 % (45.0-75.0)


 % (45.0-75.0)





 


Lymphocytes (%) (Auto)


 


 % (20.0-45.0)


 % (20.0-45.0)





 


Monocytes (%) (Auto)   % (1.0-10.0)    % (1.0-10.0)  


 


Eosinophils (%) (Auto)   % (0.0-3.0)    % (0.0-3.0)  


 


Basophils (%) (Auto)   % (0.0-2.0)    % (0.0-2.0)  


 


Differential Total Cells


Counted 


 100  


 100  





 


Neutrophils % (Manual)  85 % (45-75)  H 84 % (45-75)  H


 


Lymphocytes % (Manual)  4 % (20-45)  L 4 % (20-45)  L


 


Monocytes % (Manual)  9 % (1-10)   10 % (1-10)  


 


Eosinophils % (Manual)  0 % (0-3)   0 % (0-3)  


 


Basophils % (Manual)  0 % (0-2)   0 % (0-2)  


 


Band Neutrophils  2 % (0-8)   2 % (0-8)  


 


Platelet Estimate  Decreased  L Decreased  L


 


Platelet Morphology  Normal   Normal  


 


Hypochromasia  1+   1+  


 


Anisocytosis  2+   2+  


 


Macrocytosis  1+   1+  


 


Erythrocyte Sedimentation Rate


 


 101 MM/HR


(0-20)  H 





 


Prothrombin Time


 


 23.0 SEC


(9.30-11.50)  H 





 


Prothromb Time International


Ratio 


 2.2 (0.9-1.1)


H 





 


Activated Partial


Thromboplast Time 


 52 SEC (23-33)


H 





 


D-Dimer


 


 21.56 mg/L FEU


(0.00-0.49)  H 





 


Sodium Level


 


 135 MMOL/L


(136-145)  L 





 


Potassium Level


 


 5.2 MMOL/L


(3.5-5.1)  H 





 


Chloride Level


 


 102 MMOL/L


() 





 


Carbon Dioxide Level


 


 17 MMOL/L


(21-32)  L 





 


Anion Gap


 


 16 mmol/L


(5-15)  H 





 


Blood Urea Nitrogen


 


 51 mg/dL


(7-18)  H 





 


Creatinine


 


 2.5 MG/DL


(0.55-1.30)  H 





 


Estimat Glomerular Filtration


Rate 


 26.7 mL/min


(>60) 





 


Glucose Level


 


 73 MG/DL


()  L 





 


Hemoglobin A1c


 


 4.9 %


(4.3-6.0) 5.3 %


(4.3-6.0)


 


Lactic Acid Level


 


 6.30 mmol/L


(0.4-2.0)  H 7.80 mmol/L


(0.66-2.22)  H


 


Calcium Level


 


 8.7 MG/DL


(8.5-10.1) 





 


Total Bilirubin


 


 26.8 MG/DL


(0.2-1.0)  H 





 


Direct Bilirubin


 


 19.5 MG/DL


(0.0-0.3)  H 





 


Aspartate Amino Transf


(AST/SGOT) 


 204 U/L


(15-37)  H 





 


Alanine Aminotransferase


(ALT/SGPT) 


 80 U/L (12-78)


H 





 


Alkaline Phosphatase


 


 659 U/L


()  H 





 


C-Reactive Protein,


Quantitative 


 12.6 mg/dL


(0.00-0.90)  H 





 


Pro-B-Type Natriuretic Peptide


 


 2317 pg/mL


(0-125)  H 





 


Total Protein


 


 7.1 G/DL


(6.4-8.2) 





 


Albumin


 


 1.9 G/DL


(3.4-5.0)  L 





 


Globulin  5.2 g/dL   


 


Albumin/Globulin Ratio


 


 0.4 (1.0-2.7)


L 





 


Prealbumin  Pending   


 


Triglycerides Level


 


 134 MG/DL


() 





 


Cholesterol Level


 


 < 50 MG/DL (<


200) 





 


LDL Cholesterol


 


 23 mg/dL


(<100) 





 


HDL Cholesterol


 


 6 MG/DL


(40-60)  L 





 


Cholesterol/HDL Ratio   (3.3-4.4)   


 


Amylase Level


 


 1661 U/L


()  *H 





 


Lipase


 


 > 2000 U/L


()  H 





 


Thyroid Stimulating Hormone


(TSH) 


 4.146 uiU/mL


(0.358-3.740) 














Plan


Problems:  


(1) UTI (urinary tract infection)


(2) Coagulopathy


(3) Leukocytosis


(4) Pancreatitis


Assessment & Plan:  60-year-old male with liver disease now pancreatitis.  

Abdominal pain.  Labs noted imaging reviewed.  No acute surgical intervention 

necessary at this time.  GI eval liver eval n.p.o. IV fluids trend labs we will 

follow with you on exam recommendations thank you for letting present patient's 

care





There are


colonic diverticula. No definite evidence of diverticulitis. The appendix is 

normal.


There is a moderate amount of free intraperitoneal fluid. No free 

intraperitoneal


gas. The stomach and duodenum are unremarkable.


 


Lack of IV contrast limits assessment of the solid organs. The liver is very 

atrophic


and demonstrates extensive surface nodularity. Surgical clips are seen at the


inferior aspect of the left hepatic lobe. Arising from segment 4A, there is a 

round


exophytic mass which measures approximately 6.7 cm in diameter. This 

demonstrates


some peripheral calcification. Large perigastric and periesophageal varices are


demonstrated. There is a moderate amount of ascites fluid. A 

ventriculoperitoneal


shunt catheter is seen coursing through the peritoneal space and has its tip in 

the


pelvis.


 


The pancreas, spleen, adrenals, kidneys are grossly unremarkable. No 

retroperitoneal


or mesenteric mass or adenopathy. No pelvic mass or adenopathy.


 


There is bilateral gynecomastia. There is diffuse edema of the subcutaneous and


abdominal fat. There is a massive right pleural effusion. This results in comple

te


atelectasis of the right lower lobe. The included left lung base demonstrates 

some


atelectasis, no pleural fluid. The heart is borderline large. The bones are


unremarkable for age.


 


Impression: Evidence of hepatic cirrhosis, with atrophy and surface nodularity


 


6.7 cm round exophytic mass arising from segment 4A of the liver. This is 

presumably


related to stated clinical history of liver cancer. Surgical clips are seen 

adjacent


to this mass.


 


Evidence of portal hypertension, with large periesophageal and perigastric 

varices


 


Moderate ascites, likely related to liver disease. However, there is also a


ventriculoperitoneal shunt catheter which may be contributing some volume to the


ascites.


 


There is evidence of anasarca, including massive right pleural effusion, diffuse


edema of the subcutaneous and abdominal fat limiting assessment of the GI tract,

due


to lack of enteric contrast administration. No gross acute GI pathology.


 


Colonic diverticulosis. No evidence of diverticulitis


 


Borderline cardiomegaly


 


Incidental finding bilateral gynecomastia





(5) Renal insufficiency


(6) Liver failure


(7) Atrial fibrillation with RVR











Tino Norman                Feb 17, 2021 12:24

## 2021-02-17 NOTE — NUR
NURSE NOTES:

Patients Son and other family members at bedside. Patients son Arnaud Yanes informed 
this nurse that patients family has come to the decision to make their father Jimi Yanes (the patient) DNR status. Will respect families decision and inform Attending 
physician.

## 2021-02-17 NOTE — DIAGNOSTIC IMAGING REPORT
Indication: Abdominal pain

 

Technique: Gray-scale and duplex images of the upper abdomen were obtained

 

Comparison: No comparison sonograms. Reference made to CT scan of the abdomen

2/16/2021

 

Findings: There is a large right pleural effusion. There is ascites fluid. The

gallbladder fossa cannot be visualized and sonographic Saeed's sign could not be

assessed. Common bile duct could not be visualized, although there is no gross

biliary ductal dilatation. Gallbladder and bile ducts appear unremarkable on recent

CT scan. No intrahepatic biliary ductal dilatation.  Liver is atrophic, demonstrates

surface nodularity and coarsened echogenicity. The left lobe could not be well

demonstrated. The portal and hepatic veins are not well-visualized, due to patient

heavy breathing/inability to hold breath. There does appear to be to and fro flow

within the portal vein The liver mass described on recent CT scan could not be

demonstrated due to the same limitations.   Pancreas is incompletely visualized due

to overlying bowel gas, visualized portions are unremarkable.   The spleen is

enlarged, measuring 13.7 cm long axis dimension.  Left kidney measures 8.9 cm in

length.  Right kidney measures 9.4 cm length.  Both kidneys demonstrate normal

echogenicity.  There is no hydronephrosis.   No focal abnormality . Abdominal aorta

is partially obscured by bowel gas, visualized portions are non-aneurysmal .  Grossly

unremarkable inferior vena cava

 

Impression: Very limited exam, as described, with nonvisualization of the

gallbladder, common bile duct and portions of the pancreas, liver and abdominal

aorta.

 

Evidence of hepatic cirrhosis, also described on recent CT scan

 

Large right pleural effusion.

 

Moderate ascites

 

Borderline splenomegaly

## 2021-02-17 NOTE — NUR
RESPIRATORY NOTES

Code blue called @1537.

Chest compressions began by fellow RT, followed by myself.

PT was promptly removed from bipap and was ventilated via ambubag.

After successful ROSC, PT was intubated with 7.5 ETT @23cm lipline.

PT was placed on ventilator - AC/VC 16 500 +5 100%.

Will continue to monitor.

## 2021-02-17 NOTE — INTERNAL MED PROGRESS NOTE
Subjective


Date of Service:  Feb 17, 2021


Physician Name


Tra Anguiano


Attending Physician


Jeevan Harper MD





Current Medications








 Medications


  (Trade)  Dose


 Ordered  Sig/Marleen


 Route


 PRN Reason  Start Time


 Stop Time Status Last Admin


Dose Admin


 


 Al Hydroxide/Mg


 Hydroxide


  (Mylanta II)  30 ml  Q6H  PRN


 ORAL


 dyspepsia  2/16/21 16:45


 3/18/21 16:44   





 


 Dextrose


  (Dextrose 50%)  25 ml  Q30M  PRN


 IV


 Hypoglycemia  2/17/21 07:30


 5/18/21 07:29   





 


 Dextrose


  (Dextrose 50%)  50 ml  Q30M  PRN


 IV


 Hypoglycemia  2/17/21 07:30


 5/18/21 07:29  2/17/21 11:42





 


 Diphenhydramine


 HCl


  (Benadryl)  25 mg  Q6H  PRN


 ORAL


 Itching/Pruritis  2/16/21 16:45


 3/18/21 16:44   





 


 Insulin Aspart


  (NovoLOG)    BEFORE MEALS AND  HS


 SUBQ


   2/17/21 11:30


 5/18/21 11:29   





 


 Lactulose


  (Cephulac)  20 gm  THREE TIMES A  DAY


 ORAL


   2/17/21 09:00


 3/19/21 08:59  2/17/21 08:51





 


 Lorazepam


  (Ativan)  1 mg  Q4H  PRN


 ORAL


 For Anxiety  2/16/21 16:45


 2/23/21 16:44  2/17/21 03:40





 


 Metoprolol


 Tartrate


  (Lopressor)  25 mg  Q12HR


 ORAL


   2/17/21 09:00


 5/18/21 08:59  2/17/21 08:29





 


 Morphine Sulfate


  (Morphine


 Sulfate)  2 mg  Q3H  PRN


 IVP


 Moderate Pain (Pain Scale 4-6)  2/16/21 16:45


 2/23/21 16:44  2/17/21 02:59





 


 Ondansetron HCl


  (Zofran)  4 mg  Q6H  PRN


 IVP


 Nausea & Vomiting  2/16/21 16:45


 3/18/21 16:44   





 


 Piperacillin Sod/


 Tazobactam Sod


 3.375 gm/Sodium


 Chloride  110 ml @ 


 27.5 mls/hr  EVERY 8  HOURS


 IVPB


   2/17/21 10:00


 2/22/21 09:59  2/17/21 11:24





 


 Rifaximin


  (Xifaxan)  550 mg  EVERY 12  HOURS


 ORAL


   2/17/21 09:00


 2/24/21 08:59  2/17/21 08:51





 


 Sodium Chloride  1,000 ml @ 


 50 mls/hr  Q20H


 IVLG


   2/16/21 17:45


 3/18/21 17:44  2/16/21 17:51





 


 Temazepam


  (Restoril)  15 mg  DAILYPRN  PRN


 ORAL


 Insomnia  2/16/21 16:45


 2/23/21 16:44   





 


 Vancomycin HCl


  ([facility] pharmacy


 to dose)  1 ea  DAILY  PRN


 MISC


 Per rx protocol  2/17/21 08:00


 3/19/21 07:59   











Allergies:  


Coded Allergies:  


     CEPHALEXIN (Verified  Allergy, Unknown, 2/16/21)


ROS Limited/Unobtainable:  Yes


Subjective


59 YO M admitted with generalized weakness.  Now liver failure and atrial 

fibrillation with rapid ventricular rate.  Cover for Int Steve-Dr Harper





Objective





Last Vital Signs








  Date Time  Temp Pulse Resp B/P (MAP) Pulse Ox O2 Delivery O2 Flow Rate FiO2


 


2/17/21 09:00      Room Air  


 


2/17/21 08:29  134  114/63    


 


2/17/21 08:00 96.6  20  99   


 


2/16/21 13:11       2.0 


 


2/16/21 13:11        100











Laboratory Tests








Test


 2/16/21


14:24 2/17/21


05:43 2/17/21


08:40


 


Urine Color Orange    


 


Urine Appearance Cloudy    


 


Urine pH 5 (4.5-8.0)    


 


Urine Specific Gravity


 1.020


(1.005-1.035) 


 





 


Urine Protein


 2+ (NEGATIVE)


H 


 





 


Urine Glucose (UA)


 1+ (NEGATIVE)


H 


 





 


Urine Ketones


 1+ (NEGATIVE)


H 


 





 


Urine Blood


 4+ (NEGATIVE)


H 


 





 


Urine Nitrite


 Positive


(NEGATIVE)  H 


 





 


Urine Bilirubin


 3+ (NEGATIVE)


H 


 





 


Urine Ictotest


 Positive


(NEGATIVE) 


 





 


Urine Urobilinogen


 8 MG/DL


(0.0-1.0)  H 


 





 


Urine Leukocyte Esterase


 3+ (NEGATIVE)


H 


 





 


Urine RBC


 2-4 /HPF (0 -


0)  H 


 





 


Urine WBC


 Tntc /HPF (0 -


0)  H 


 





 


Urine Squamous Epithelial


Cells Occasional


/LPF 


 





 


Urine Bacteria


 Many /HPF


(NONE)  H 


 





 


Urine Coarse Granular Casts


 0-2 /LPF


(NONE)  H 


 





 


White Blood Count


 


 24.4 K/UL


(4.8-10.8)  #*H 22.6 K/UL


(4.8-10.8)  *H


 


Red Blood Count


 


 2.99 M/UL


(4.70-6.10)  L 3.12 M/UL


(4.70-6.10)  L


 


Hemoglobin


 


 9.3 G/DL


(14.2-18.0)  L 9.6 G/DL


(14.2-18.0)  L


 


Hematocrit


 


 30.4 %


(42.0-52.0)  L 32.5 %


(42.0-52.0)  L


 


Mean Corpuscular Volume


 


 102 FL (80-99)


H 104 FL (80-99)


H


 


Mean Corpuscular Hemoglobin


 


 31.2 PG


(27.0-31.0)  H 30.6 PG


(27.0-31.0)


 


Mean Corpuscular Hemoglobin


Concent 


 30.6 G/DL


(32.0-36.0)  L 29.4 G/DL


(32.0-36.0)  L


 


Red Cell Distribution Width


 


 24.0 %


(11.6-14.8)  H 24.4 %


(11.6-14.8)  H


 


Platelet Count


 


 113 K/UL


(150-450)  L 120 K/UL


(150-450)  L


 


Mean Platelet Volume


 


 8.6 FL


(6.5-10.1) 9.4 FL


(6.5-10.1)


 


Neutrophils (%) (Auto)


 


 % (45.0-75.0)


 % (45.0-75.0)





 


Lymphocytes (%) (Auto)


 


 % (20.0-45.0)


 % (20.0-45.0)





 


Monocytes (%) (Auto)   % (1.0-10.0)    % (1.0-10.0)  


 


Eosinophils (%) (Auto)   % (0.0-3.0)    % (0.0-3.0)  


 


Basophils (%) (Auto)   % (0.0-2.0)    % (0.0-2.0)  


 


Differential Total Cells


Counted 


 100  


 100  





 


Neutrophils % (Manual)  85 % (45-75)  H 84 % (45-75)  H


 


Lymphocytes % (Manual)  4 % (20-45)  L 4 % (20-45)  L


 


Monocytes % (Manual)  9 % (1-10)   10 % (1-10)  


 


Eosinophils % (Manual)  0 % (0-3)   0 % (0-3)  


 


Basophils % (Manual)  0 % (0-2)   0 % (0-2)  


 


Band Neutrophils  2 % (0-8)   2 % (0-8)  


 


Platelet Estimate  Decreased  L Decreased  L


 


Platelet Morphology  Normal   Normal  


 


Hypochromasia  1+   1+  


 


Anisocytosis  2+   2+  


 


Macrocytosis  1+   1+  


 


Erythrocyte Sedimentation Rate


 


 101 MM/HR


(0-20)  H 





 


Prothrombin Time


 


 23.0 SEC


(9.30-11.50)  H 





 


Prothromb Time International


Ratio 


 2.2 (0.9-1.1)


H 





 


Activated Partial


Thromboplast Time 


 52 SEC (23-33)


H 





 


D-Dimer


 


 21.56 mg/L FEU


(0.00-0.49)  H 





 


Sodium Level


 


 135 MMOL/L


(136-145)  L 





 


Potassium Level


 


 5.2 MMOL/L


(3.5-5.1)  H 





 


Chloride Level


 


 102 MMOL/L


() 





 


Carbon Dioxide Level


 


 17 MMOL/L


(21-32)  L 





 


Anion Gap


 


 16 mmol/L


(5-15)  H 





 


Blood Urea Nitrogen


 


 51 mg/dL


(7-18)  H 





 


Creatinine


 


 2.5 MG/DL


(0.55-1.30)  H 





 


Estimat Glomerular Filtration


Rate 


 26.7 mL/min


(>60) 





 


Glucose Level


 


 73 MG/DL


()  L 





 


Hemoglobin A1c


 


 4.9 %


(4.3-6.0) 5.3 %


(4.3-6.0)


 


Lactic Acid Level


 


 6.30 mmol/L


(0.4-2.0)  H 7.80 mmol/L


(0.66-2.22)  H


 


Calcium Level


 


 8.7 MG/DL


(8.5-10.1) 





 


Total Bilirubin


 


 26.8 MG/DL


(0.2-1.0)  H 





 


Direct Bilirubin


 


 19.5 MG/DL


(0.0-0.3)  H 





 


Aspartate Amino Transf


(AST/SGOT) 


 204 U/L


(15-37)  H 





 


Alanine Aminotransferase


(ALT/SGPT) 


 80 U/L (12-78)


H 





 


Alkaline Phosphatase


 


 659 U/L


()  H 





 


C-Reactive Protein,


Quantitative 


 12.6 mg/dL


(0.00-0.90)  H 





 


Pro-B-Type Natriuretic Peptide


 


 2317 pg/mL


(0-125)  H 





 


Total Protein


 


 7.1 G/DL


(6.4-8.2) 





 


Albumin


 


 1.9 G/DL


(3.4-5.0)  L 





 


Globulin  5.2 g/dL   


 


Albumin/Globulin Ratio


 


 0.4 (1.0-2.7)


L 





 


Prealbumin  Pending   


 


Triglycerides Level


 


 134 MG/DL


() 





 


Cholesterol Level


 


 < 50 MG/DL (<


200) 





 


LDL Cholesterol


 


 23 mg/dL


(<100) 





 


HDL Cholesterol


 


 6 MG/DL


(40-60)  L 





 


Cholesterol/HDL Ratio   (3.3-4.4)   


 


Amylase Level


 


 1661 U/L


()  *H 





 


Lipase


 


 > 2000 U/L


()  H 





 


Thyroid Stimulating Hormone


(TSH) 


 4.146 uiU/mL


(0.358-3.740) 














Microbiology








 Date/Time


Source Procedure


Growth Status





 


 2/16/21 14:24


Urine,Clean Catch Urine Culture - Preliminary


NO GROWTH Resulted

















Intake and Output  


 


 2/16/21 2/17/21





 19:00 07:00


 


Intake Total  550 ml


 


Balance  550 ml


 


  


 


Intake Oral  0 ml


 


IV Total  550 ml


 


# Voids  1








Objective


PHYSICAL EXAMINATION:


GENERAL:  Patient is well-developed and well-nourished  male, who


is obviously jaundiced.


HEENT:  Eyes with scleral icterus, otherwise pupils are equal and


responsive to light and accommodation.  Extraocular movements are


intact.


NECK:  Supple without lymphadenopathy.


CHEST:  Lungs are clear to auscultation bilaterally without wheezes or


rales.


CARDIOVASCULAR:  Regular rhythm and rate.  S1-S2 are normal without


murmurs, rubs, or gallops.


ABDOMEN:  Distended, decreased bowel sounds with no rebound or guarding to


palpitation.


RECTAL/GENITAL:  Refused.


NEUROLOGIC:  Cranial nerves II through XII are grossly intact without focal


deficits.  Motor strength is 5/5 bilaterally.  Deep tendon reflexes are 2+


plantar.





Assessment/Plan


Assessment/Plan


ASSESSMENT:  This is a 60-year-old  male.





1. Liver mass.


2. Liver failure.


3. Urinary tract infection.


4. Atrial fibrillation with rapid ventricular rate.


5. Pancreatitis.


6. Coagulopathy.


7. Renal failure.


8. Elevated liver function tests.


9. Hypertension.


10. Hypercholesterolemia.





TREATMENT:


1. Liver mass.  An Oncology consultation has been obtained with Dr. Lira.  Patient has a stated history of possible liver cancer, however


this has not been diagnosed.


2. Liver failure.  This may be secondary to liver cancer as above.  A


Gastroenterology consultation has been obtained with Dr. Shaq Linder.


3. Urinary tract infection.  Patient has been placed empirically on


intravenous Levaquin.  A urine culture is pending.


4. Atrial fibrillation with rapid ventricular rate.  A Cardiology


consultation has been obtained with Dr. Eduardo Briggs.


5. Pancreatitis.  As above, a Gastroenterology consultation has been


obtained with Dr. Shqa Linder.


6. Coagulopathy.  This is secondary to liver failure as above.


7. Renal failure.  This is probably secondary to hepatorenal failure.


8. Elevated liver function tests.


9. Hypertension.  Continue metoprolol as above.


10. Hypercholesterolemia.  Continue atorvastatin as above.











Tra Anguiano MD               Feb 17, 2021 12:00

## 2021-02-17 NOTE — NUR
NURSE NOTES:

With pt still being 130-150 on cardiac monitor sustained contacted Dr Harper notifying him of 
the change and awaiting new orders.

## 2021-02-17 NOTE — NUR
NURSE NOTES:

Report received from Blanca GALVEZ. Patient seen on rounds, asleep but responsive to tactile 
stimuli. GCS 8 (E2, MM 4, V2), FLACC 4. Pt is on room air with no signs of acute distress. 
PIV over left EJ patent and infusing NS @ 50cc/hr. Jaundice noted. Abdomen is round and 
tender to deep palpation. Pt has generalized edema. Incontinent and bedbound. Bed low and 
locked, siderails up x2, call light placed within reach, zone alarms on 1, will reassess on 
next rounds.

## 2021-02-17 NOTE — NUR
NURSE NOTES:

Patient is transferred from [facility]. Received report from LEONOR De Paz. Patient is on Bipap, restless 
and unable to follow commands.

## 2021-02-17 NOTE — NUR
CASE MANAGEMENT:REVIEW



60YR OLD MALE PRESENTED TO ER



CC: SEVERE GENERALIZED PAIN



SI: LIVER FAILURE

97.0   150  14  129/58  96% ON RA

WBC+14.6   H/H-8.9/28.6   





IS: IV ZOFRAN 

IV PEPCID

IV MORPHINE X2

IV DILAUDID 

IV METOPROLOL X1

IV LEVAQUIN X1

BLOOD CX

C ABD/PELVIS

CXR

**: ADMITTED TO TELEMETRY THEN TRANSFERRED TO ICU

DCP: FROM HOME

## 2021-02-17 NOTE — NUR
NURSE NOTES:

Pt transferred to [facility] per Dr. Anguiano. Report given to Zandra RN. Endorsed that we were unable to 
place barron catheter or collect urine via straight cath due to increased resistance and no 
urine flow. Still pending urinalysis. PIV on left EJ and right upper arm intact. Belongings 
accounted for. Daughter Meredith notified of transfer.

## 2021-02-17 NOTE — NUR
NURSE NOTES:

Pt recievced from Sara GALVEZ. PT obtunded, slight moan noted to severe pain. Pt yellow to 
entire body severely edematous (+4) pt on 2 liters NC increased to6 to keep him above 92%. 
Vitals as follows 101/44, 98.6 Axillary, 84 HR, 10 respiration and noted to be agonal 
gasping. Called RT and did stat ABG, hco3 noted to be severely low. Notifed Dr. Harper re all 
of this. Charge RN Radha also notified Dr. Anguiano.



Per Sara Alexis has ordered D5 W however pt severely swollen and no Catheter placed 
yet. Will start after barron insertion.

## 2021-02-17 NOTE — NUR
NURSE NOTES:

Spoke with Dr. Linder regarding patients hemoglobin of 5.2, orders received for 2 units of 
PRBC now and follow up with CBC after transfusion and give 1 X PRBC if hemoglobin remains 
less than 7.0.

## 2021-02-17 NOTE — EMERGENCY ROOM REPORT
History of Present Illness


General


Chief Complaint:  Abdominal Pain


Source:  Patient, Medical Record, PMD





Present Illness


Allergies:  


Coded Allergies:  


     CEPHALEXIN (Verified  Allergy, Unknown, 2/16/21)





COVID-19 Screening


Contact w/high risk pt:  No


Experienced COVID-19 symptoms?:  No


COVID-19 Testing performed PTA:  No





Nursing Documentation-PMH


Past Medical History:  No History, Except For


Hx Hypertension:  Yes


Hx Diabetes:  Yes


Hx Cancer:  Yes - liver


Hx Gastrointestinal Problems:  Yes - liver cirrosis





Physical Exam





Vital Signs








  Date Time  Temp Pulse Resp B/P (MAP) Pulse Ox O2 Delivery O2 Flow Rate FiO2


 


2/16/21 10:40 97.0 56 14 129/58 (81) 96 Room Air  


 


2/16/21 13:11       2.0 100











Procedures


Central Line


Central Line :  


   Consent:  Emergent


   Central Line Lumen:  triple


   Maximal Sterile Barrier Tech:  yes cap, yes mask, yes sterile gown, yes 

sterile gloves, yes large sterile sheet, yes hand hygiene, yes chlorhexidine 

prep


   No Max Barrier Tech Because:  emergency insertion


   Central Line Postion:  internal jugular (R)


   US Guided Line?:  Yes


   Vessel visualized with U/S:  Right Internal Jugular


   Ultrasound Findings:  Collapsible Vessel, Vessel Patent, Color flow present, 

Visualize vessel puncture


   Complications:  none


   Central Line Post Position:  sutured, good blood return, position confirmed 

w/ CXR


   Attempts:  One


   Patient Tolerated:  Well


   Complications:  None





CPR/Code Blue


CPR/Code Blue Narrative


I was called to the bedside for CODE BLUE.  When I arrived CPR was in progress. 

Please see separate code sheet for full details.  Return of spontaneous 

regulation was achieved.  Patient was intubated and central line emergently 

inserted and right IJ.





Intubation


Intubation :  


   Consent:  Emergent


   Intubation Method:  orotracheal


   Tube Size (cm):  7.5


   Breath Sounds after Intubation:  equal


   Intubation Complications:  no complications


   Post Intubation Xray:  Yes


   Attempts:  One


   Patient Tolerated:  Well


   Complications:  None





Medical Decision Making


Diagnostic Impression:  


   Primary Impression:  


   Liver failure


   Additional Impressions:  


   Atrial fibrillation with RVR


   Pleural effusion associated with hepatic disorder


   Coagulopathy


   Leukocytosis


   Pancreatitis


   Renal insufficiency


   UTI (urinary tract infection)





Last Vital Signs








  Date Time  Temp Pulse Resp B/P (MAP) Pulse Ox O2 Delivery O2 Flow Rate FiO2


 


2/17/21 16:05  112 20     100


 


2/17/21 15:30    43/26    


 


2/17/21 14:53     100   


 


2/17/21 14:53      Bi-Pap  


 


2/17/21 12:00 96.1       


 


2/16/21 13:11       2.0 








Disposition:  ADMITTED AS INPATIENT


Condition:  Serious


Referrals:  


NON PHYSICIAN (PCP)











Rayo Gupta M.D.                Feb 17, 2021 17:29

## 2021-02-17 NOTE — NUR
NURSE NOTES:

Pt noted to be sinus tachycardia and pain medication given. Will continue to follow up.

## 2021-02-17 NOTE — NUR
NURSE NOTES:

Patient had episode of bradycardia in the 40s, patient eventually came back to NSR in the 
80s

## 2021-02-17 NOTE — NUR
NURSE NOTES:

Dr. Alexis notified of critical blood sugar 43. D50 SIVP given, rechecked and improved to 
116. Received orders to start on D5 1/2 NS @ 100ml/hr. Orders noted and carried out.

## 2021-02-18 VITALS — DIASTOLIC BLOOD PRESSURE: 31 MMHG | SYSTOLIC BLOOD PRESSURE: 64 MMHG

## 2021-02-18 VITALS — DIASTOLIC BLOOD PRESSURE: 67 MMHG | SYSTOLIC BLOOD PRESSURE: 90 MMHG

## 2021-02-18 VITALS — SYSTOLIC BLOOD PRESSURE: 74 MMHG | DIASTOLIC BLOOD PRESSURE: 53 MMHG

## 2021-02-18 VITALS — SYSTOLIC BLOOD PRESSURE: 100 MMHG | DIASTOLIC BLOOD PRESSURE: 51 MMHG

## 2021-02-18 VITALS — DIASTOLIC BLOOD PRESSURE: 56 MMHG | SYSTOLIC BLOOD PRESSURE: 106 MMHG

## 2021-02-18 VITALS — SYSTOLIC BLOOD PRESSURE: 79 MMHG | DIASTOLIC BLOOD PRESSURE: 45 MMHG

## 2021-02-18 VITALS — DIASTOLIC BLOOD PRESSURE: 41 MMHG | SYSTOLIC BLOOD PRESSURE: 72 MMHG

## 2021-02-18 VITALS — SYSTOLIC BLOOD PRESSURE: 99 MMHG | DIASTOLIC BLOOD PRESSURE: 70 MMHG

## 2021-02-18 VITALS — SYSTOLIC BLOOD PRESSURE: 65 MMHG | DIASTOLIC BLOOD PRESSURE: 55 MMHG

## 2021-02-18 VITALS — DIASTOLIC BLOOD PRESSURE: 64 MMHG | SYSTOLIC BLOOD PRESSURE: 104 MMHG

## 2021-02-18 VITALS — SYSTOLIC BLOOD PRESSURE: 84 MMHG | DIASTOLIC BLOOD PRESSURE: 59 MMHG

## 2021-02-18 VITALS — SYSTOLIC BLOOD PRESSURE: 67 MMHG | DIASTOLIC BLOOD PRESSURE: 49 MMHG

## 2021-02-18 VITALS — DIASTOLIC BLOOD PRESSURE: 57 MMHG | SYSTOLIC BLOOD PRESSURE: 124 MMHG

## 2021-02-18 VITALS — SYSTOLIC BLOOD PRESSURE: 70 MMHG | DIASTOLIC BLOOD PRESSURE: 38 MMHG

## 2021-02-18 VITALS — DIASTOLIC BLOOD PRESSURE: 60 MMHG | SYSTOLIC BLOOD PRESSURE: 82 MMHG

## 2021-02-18 VITALS — DIASTOLIC BLOOD PRESSURE: 59 MMHG | SYSTOLIC BLOOD PRESSURE: 80 MMHG

## 2021-02-18 VITALS — DIASTOLIC BLOOD PRESSURE: 42 MMHG | SYSTOLIC BLOOD PRESSURE: 67 MMHG

## 2021-02-18 VITALS — DIASTOLIC BLOOD PRESSURE: 52 MMHG | SYSTOLIC BLOOD PRESSURE: 78 MMHG

## 2021-02-18 VITALS — DIASTOLIC BLOOD PRESSURE: 70 MMHG | SYSTOLIC BLOOD PRESSURE: 91 MMHG

## 2021-02-18 VITALS — SYSTOLIC BLOOD PRESSURE: 98 MMHG | DIASTOLIC BLOOD PRESSURE: 67 MMHG

## 2021-02-18 VITALS — SYSTOLIC BLOOD PRESSURE: 79 MMHG | DIASTOLIC BLOOD PRESSURE: 55 MMHG

## 2021-02-18 VITALS — SYSTOLIC BLOOD PRESSURE: 77 MMHG | DIASTOLIC BLOOD PRESSURE: 56 MMHG

## 2021-02-18 VITALS — DIASTOLIC BLOOD PRESSURE: 54 MMHG | SYSTOLIC BLOOD PRESSURE: 85 MMHG

## 2021-02-18 VITALS — DIASTOLIC BLOOD PRESSURE: 57 MMHG | SYSTOLIC BLOOD PRESSURE: 83 MMHG

## 2021-02-18 VITALS — DIASTOLIC BLOOD PRESSURE: 68 MMHG | SYSTOLIC BLOOD PRESSURE: 93 MMHG

## 2021-02-18 VITALS — DIASTOLIC BLOOD PRESSURE: 54 MMHG | SYSTOLIC BLOOD PRESSURE: 86 MMHG

## 2021-02-18 VITALS — DIASTOLIC BLOOD PRESSURE: 37 MMHG | SYSTOLIC BLOOD PRESSURE: 55 MMHG

## 2021-02-18 VITALS — SYSTOLIC BLOOD PRESSURE: 64 MMHG | DIASTOLIC BLOOD PRESSURE: 42 MMHG

## 2021-02-18 VITALS — DIASTOLIC BLOOD PRESSURE: 68 MMHG | SYSTOLIC BLOOD PRESSURE: 106 MMHG

## 2021-02-18 VITALS — SYSTOLIC BLOOD PRESSURE: 91 MMHG | DIASTOLIC BLOOD PRESSURE: 65 MMHG

## 2021-02-18 VITALS — SYSTOLIC BLOOD PRESSURE: 69 MMHG | DIASTOLIC BLOOD PRESSURE: 47 MMHG

## 2021-02-18 VITALS — DIASTOLIC BLOOD PRESSURE: 65 MMHG | SYSTOLIC BLOOD PRESSURE: 90 MMHG

## 2021-02-18 VITALS — SYSTOLIC BLOOD PRESSURE: 86 MMHG | DIASTOLIC BLOOD PRESSURE: 53 MMHG

## 2021-02-18 VITALS — SYSTOLIC BLOOD PRESSURE: 105 MMHG | DIASTOLIC BLOOD PRESSURE: 62 MMHG

## 2021-02-18 VITALS — SYSTOLIC BLOOD PRESSURE: 102 MMHG | DIASTOLIC BLOOD PRESSURE: 70 MMHG

## 2021-02-18 VITALS — SYSTOLIC BLOOD PRESSURE: 102 MMHG | DIASTOLIC BLOOD PRESSURE: 66 MMHG

## 2021-02-18 VITALS — SYSTOLIC BLOOD PRESSURE: 93 MMHG | DIASTOLIC BLOOD PRESSURE: 55 MMHG

## 2021-02-18 VITALS — DIASTOLIC BLOOD PRESSURE: 71 MMHG | SYSTOLIC BLOOD PRESSURE: 99 MMHG

## 2021-02-18 LAB
% IRON SATURATION: 68 % (ref 15–50)
ADD MANUAL DIFF: NO
ALBUMIN SERPL-MCNC: 2.1 G/DL (ref 3.4–5)
ALBUMIN/GLOB SERPL: 0.7 {RATIO} (ref 1–2.7)
ALP SERPL-CCNC: 512 U/L (ref 46–116)
ALT SERPL-CCNC: 792 U/L (ref 12–78)
AMMONIA PLAS-SCNC: 78 UMOL/L (ref 11–32)
AMYLASE SERPL-CCNC: 1199 U/L (ref 25–115)
ANION GAP SERPL CALC-SCNC: 23 MMOL/L (ref 5–15)
APTT BLD: > 150 SEC (ref 23–33)
BILIRUB DIRECT SERPL-MCNC: 13.1 MG/DL (ref 0–0.3)
BILIRUB SERPL-MCNC: 21.7 MG/DL (ref 0.2–1)
BUN SERPL-MCNC: 47 MG/DL (ref 7–18)
CALCIUM SERPL-MCNC: 7.7 MG/DL (ref 8.5–10.1)
CHLORIDE SERPL-SCNC: 102 MMOL/L (ref 98–107)
CO2 SERPL-SCNC: 11 MMOL/L (ref 21–32)
CREAT SERPL-MCNC: 2.9 MG/DL (ref 0.55–1.3)
ERYTHROCYTE [DISTWIDTH] IN BLOOD BY AUTOMATED COUNT: 25.2 % (ref 11.6–14.8)
FERRITIN SERPL-MCNC: 446 NG/ML (ref 8–388)
GAMMA GLUTAMYL TRANSPEPTIDASE: 47 U/L (ref 5–85)
GLOBULIN SER-MCNC: 3.1 G/DL
HCT VFR BLD CALC: 28.6 % (ref 42–52)
HGB BLD-MCNC: 8.5 G/DL (ref 14.2–18)
INR PPP: > 10 (ref 0.9–1.1)
IRON SERPL-MCNC: 50 UG/DL (ref 50–175)
LDH SERPL L TO P-CCNC: 2906 U/L (ref 81–234)
MCV RBC AUTO: 101 FL (ref 80–99)
PHOSPHATE SERPL-MCNC: 8.8 MG/DL (ref 2.5–4.9)
PLATELET # BLD: 62 K/UL (ref 150–450)
POTASSIUM SERPL-SCNC: 5.9 MMOL/L (ref 3.5–5.1)
RBC # BLD AUTO: 2.83 M/UL (ref 4.7–6.1)
SODIUM SERPL-SCNC: 134 MMOL/L (ref 136–145)
TIBC SERPL-MCNC: 73 UG/DL (ref 250–450)
UNSATURATED IRON BINDING: 23 UG/DL (ref 112–346)
WBC # BLD AUTO: 15.1 K/UL (ref 4.8–10.8)

## 2021-02-18 RX ADMIN — PHENYLEPHRINE HYDROCHLORIDE SCH MLS/HR: 10 INJECTION INTRAVENOUS at 00:00

## 2021-02-18 RX ADMIN — PHENYLEPHRINE HYDROCHLORIDE SCH MLS/HR: 10 INJECTION INTRAVENOUS at 13:16

## 2021-02-18 RX ADMIN — INSULIN ASPART SCH UNITS: 100 INJECTION, SOLUTION INTRAVENOUS; SUBCUTANEOUS at 05:00

## 2021-02-18 RX ADMIN — DEXTROSE MONOHYDRATE SCH MLS/HR: 50 INJECTION, SOLUTION INTRAVENOUS at 13:17

## 2021-02-18 RX ADMIN — LACTULOSE SCH GM: 20 SOLUTION ORAL at 17:49

## 2021-02-18 RX ADMIN — INSULIN ASPART SCH UNITS: 100 INJECTION, SOLUTION INTRAVENOUS; SUBCUTANEOUS at 11:30

## 2021-02-18 RX ADMIN — DEXTROSE MONOHYDRATE SCH MLS/HR: 50 INJECTION, SOLUTION INTRAVENOUS at 04:59

## 2021-02-18 RX ADMIN — LACTULOSE SCH GM: 20 SOLUTION ORAL at 13:00

## 2021-02-18 RX ADMIN — INSULIN ASPART SCH UNITS: 100 INJECTION, SOLUTION INTRAVENOUS; SUBCUTANEOUS at 16:30

## 2021-02-18 RX ADMIN — PHENYLEPHRINE HYDROCHLORIDE SCH MLS/HR: 10 INJECTION INTRAVENOUS at 06:00

## 2021-02-18 RX ADMIN — SODIUM BICARBONATE SCH MLS/HR: 84 INJECTION, SOLUTION INTRAVENOUS at 06:00

## 2021-02-18 RX ADMIN — LACTULOSE SCH GM: 20 SOLUTION ORAL at 09:17

## 2021-02-18 NOTE — DIAGNOSTIC IMAGING REPORT
Indication: Bilateral lower extremity edema

 

Technique: Grayscale and duplex images of the  bilateral lower extremity veins

 

Comparison: None

 

Findings: Bilaterally,   grayscale and duplex images demonstrate no evidence of

intraluminal thrombus. Normal phasic Doppler waveforms, demonstrating normal

augmentation response and no evidence of valvular insufficiency. Greater saphenous

vein(s) and tibial veins are patent. Normal compressibility. 

 

Impression: Negative for evidence of lower extremity deep venous thrombosis 

bilaterally

## 2021-02-18 NOTE — NUR
NURSE NOTES:

Orders received from Dr. Lira for 2 units of plateletpheresis, Vit K one time, venous 
duplex. Will continue to monitor.

## 2021-02-18 NOTE — CONSULTATION
DATE OF CONSULTATION:  02/18/2021

ENDOCRINOLOGY CONSULTATION



CONSULTING PHYSICIAN:  Rory Alexis MD.



REFERRING PHYSICIAN:  Jeevan Harper MD.



REASON FOR CONSULTATION:  Diabetes and hypoglycemia management.



HISTORY OF PRESENT ILLNESS:  It is important to note that history was

obtained mostly from the review of the chart and medical record due to the

patient's condition.  The patient is a 60-year-old  male with past

medical history of liver cirrhosis and possible liver cancer, presented to

the hospital with abdominal pain, abdominal swelling, and hypoglycemia.

The patient was in atrial fibrillation and liver failure.  The patient was

hypoglycemic, therefore, Endocrinology was consulted and started the

patient on dextrose infusion and was transferred to the ICU and is

now doing well.



PAST MEDICAL HISTORY:

1. Diabetes.

2. Liver cirrhosis.

3. Possible liver mass.

4. Hypertension.



ALLERGIES:  Cephalexin.



MEDICATIONS:  Reviewed and reconciled.



SOCIAL HISTORY:  Not obtainable.



FAMILY HISTORY:  Not obtainable.



REVIEW OF SYSTEMS:  Unobtainable.



PAST SURGICAL HISTORY:  Based on history, there is some surgery on the

liver but it is unknown exactly what.



PHYSICAL EXAMINATION:

VITAL SIGNS:  Doing poorly.  Blood pressure is 84/59, heart rate 82,

temperature 93.

GENERAL:  The patient is quite lethargic and obtunded.

HEART:  Regular.

LUNGS:  Decreased breath sounds.

ABDOMEN:  Distended.

EXTREMITIES:  Positive for edema.



LABORATORY VALUES:  WBC 15, hemoglobin 8, hematocrit 28, platelets of 62.

Sodium 134, potassium 5.9, chloride 102, bicarb 11, BUN 47, creatinine

2.9.  Hemoglobin A1c of 5.3.  Amylase 1100, lipase more than 2000.  TSH

2.3.



DIAGNOSES:

1. Liver failure.

2. Pancreatitis.

3. Anemia.

4. Hypoglycemia.

5. Diabetes.



PLAN:

1. Change of the code status by family to DNR noted.

2. We will continue IV fluids - dextrose.

3. Continue glucose monitoring.

4. Hypoglycemia protocol.

5. No need for scheduled diabetic medication.

6. Continue ICU care.

7. Prognosis remains very poor.



Thank you, Dr. Harper, for the courtesy of this consultation.









  ______________________________________________

  Rory Alexis M.D.





DR:  RN/VR

D:  02/18/2021 06:26

T:  02/18/2021 13:25

JOB#:  [id number]

CC:



ANDER

## 2021-02-18 NOTE — NUR
NURSE NOTES:



Dr. Linder notified there is no NG-tube or OG-tube present, ordered to have the lactulose 
doses to be held, 

octreotide started at 50mcg/min and rate of 50ml/hr. also to administer vitamin K and zosyn. 


there is no bleeding noted from the Graves or rectal areal, 

remains on Levophed at 30mcg/min, phenylephrine at 240mcg/min and vasopressin at 0.04units. 
awaiting for fresh frazen plasma to be ready.

## 2021-02-18 NOTE — NUR
NURSE NOTES:



Dr. Linder updated on the patient INR, PT, and PTT. ordered to have 2 units of Fresh frozen 
plasma. 

the patient has blood tinged sputum with no other orifice having bleeding. no bleeding 
indicated by the barron or bleeding from the rectal site. the patient skin is yellowish 
throughout the entire body.

## 2021-02-18 NOTE — NUR
NURSE NOTES:



updated next of kin, adeline walker, at number [phone] informed of her fathers 
progress. 

remains on Levophed at 30mcg/min. 0henylephrine at 240mcg/min and vasopressin at 
0.04units/min.

## 2021-02-18 NOTE — NEPHROLOGY PROGRESS NOTE
Assessment/Plan


Problem List:  


(1) ZACKARY (acute kidney injury)


(2) Liver failure


(3) Pancreatitis


(4) Atrial fibrillation with RVR


Assessment





Renal failure most likely secondary to liver failure


Liver failure, liver mass 


jaundice, pancreatitis, coagulopathy


Atrial fibrillation


UTI


Pleural effusion


Leukocytosis


Plan


February 18: Patient seen in ICU.  Patient is now DNR.  Intubated on mechanical 

ventilation.  Blood pressure low.  Clinical condition poor.  Labs reviewed.  

Serum creatinine rising.  Serum bilirubin rising.  No candidate for dialysis.  

Continue current supportive care.





Graves catheter


Urine studies


IV fluid with bicarb


Lactulose


IV Protonix 


Monitor renal parameters


Per orders





Subjective


ROS Limited/Unobtainable:  Yes





Objective


Objective





Last 24 Hour Vital Signs








  Date Time  Temp Pulse Resp B/P (MAP) Pulse Ox O2 Delivery O2 Flow Rate FiO2


 


2/18/21 10:00  73 21 86/54 (65) 95   


 


2/18/21 09:30  69 27 79/45 (56) 94   


 


2/18/21 09:00  69 25 55/37 (43) 93   


 


2/18/21 09:00  70  75/55    


 


2/18/21 08:30  75 23 77/56 (63) 97   


 


2/18/21 08:00        60


 


2/18/21 08:00 96.5 75 42 85/54 (64) 97   


 


2/18/21 07:30  77 31 91/65 (74) 95   


 


2/18/21 07:00  80 29 82/60 (67) 97   


 


2/18/21 07:00    82/46    


 


2/18/21 06:30  82 26 91/70 (77) 100   


 


2/18/21 06:00    100/65    


 


2/18/21 06:00  82  84/59    


 


2/18/21 06:00  81 30 93/55 (68) 100   


 


2/18/21 05:45  81 23 98/67 (77) 100   


 


2/18/21 05:30  82 27 93/68 (76) 100   


 


2/18/21 05:00  82 19 84/59 (67) 99   


 


2/18/21 05:00    84/59    


 


2/18/21 04:30  83 25 83/57 (66) 97   


 


2/18/21 04:00        60


 


2/18/21 04:00    102/65    


 


2/18/21 04:00 93.0 82 29 106/56 (73) 96   


 


2/18/21 04:00  93      


 


2/18/21 04:00      Mechanical Ventilator  


 


2/18/21 03:30  86 19 90/67 (75) 98   


 


2/18/21 03:23  87 16     60


 


2/18/21 03:00    88/56    


 


2/18/21 03:00  87 48 104/64 (77) 98   


 


2/18/21 02:30  86 35 102/66 (78) 98   


 


2/18/21 02:00    106/70    


 


2/18/21 02:00  91 37 102/70 (81) 98   


 


2/18/21 01:30  89 36 105/62 (76) 98   


 


2/18/21 01:00    100/65    


 


2/18/21 01:00  89 31 99/70 (80) 98   


 


2/18/21 00:30  89 31 99/71 (80) 98   


 


2/18/21 00:00  89 29 100/51 (67) 97   


 


2/18/21 00:00        60


 


2/18/21 00:00    97/69    


 


2/18/21 00:00  91  106/70    


 


2/18/21 00:00      Mechanical Ventilator  


 


2/17/21 23:30  90 26 91/53 (66) 100   


 


2/17/21 23:14  91 16     80


 


2/17/21 23:00    98/58    


 


2/17/21 23:00  90 36 98/55 (69) 100   


 


2/17/21 22:45  82 31 101/58 (72) 99   


 


2/17/21 22:30  91 29 96/60 (72) 100   


 


2/17/21 22:15  91 18 97/65 (76) 100   


 


2/17/21 22:00  90 20 98/51 (67) 100   


 


2/17/21 22:00    98/51    


 


2/17/21 21:45  93 16 106/65 (79) 100   


 


2/17/21 21:30  87 31 93/57 (69) 100   


 


2/17/21 21:15  86 16 86/50 (62) 100   


 


2/17/21 21:00    80/46    


 


2/17/21 21:00  87 33 80/46 (57) 100   


 


2/17/21 20:45  85 33 80/21 (40) 100   


 


2/17/21 20:36    73/13    


 


2/17/21 20:30  89 30 84/52 (63) 100   


 


2/17/21 20:15  92 16 100/70 (80) 100   


 


2/17/21 20:00  89      


 


2/17/21 20:00 95.0 91 19 89/55 (66) 100   


 


2/17/21 20:00      Mechanical Ventilator  


 


2/17/21 20:00        80


 


2/17/21 19:51  92 31 81/55 (64) 100   


 


2/17/21 19:45  92 34 72/42 (52) 100   


 


2/17/21 19:30  93 37 88/56 (67) 100   


 


2/17/21 19:29  94 16     80


 


2/17/21 19:26  85  73/13    


 


2/17/21 19:21  94 30 101/58 (72) 100   


 


2/17/21 19:20  94 30 88/55 (66) 100   


 


2/17/21 19:15  91 32 99/70 (80) 100   


 


2/17/21 19:00  83 25 89/57 (68) 100   


 


2/17/21 18:45  83 25 89/57 (68) 100   


 


2/17/21 18:42  85  73/13    


 


2/17/21 18:30  87 30 73/13 (33) 100   


 


2/17/21 18:21    65/25    


 


2/17/21 18:15  90 31 65/25 (38) 100   


 


2/17/21 18:00  93 33 75/36 (49) 100   


 


2/17/21 17:45  96 30 85/42 (56) 100   


 


2/17/21 17:30  98 30 88/62 (71) 100   


 


2/17/21 17:25        80


 


2/17/21 17:15  91 18 78/46 (57) 100   


 


2/17/21 17:00  98 21 70/55 (60) 100   


 


2/17/21 16:49  99 22 70/51 (57) 100   


 


2/17/21 16:45  102 22 73/50 (58) 97   


 


2/17/21 16:30  106 23 82/68 (73) 97   


 


2/17/21 16:20  111 33 123/80 (94) 95   


 


2/17/21 16:10  116 34 143/75 (97) 63   


 


2/17/21 16:05  112 20     100


 


2/17/21 16:00      Mechanical Ventilator  


 


2/17/21 16:00 96.3 57 29 170/109 (129) 93   


 


2/17/21 15:50  97 22 43/26 (32) 93   


 


2/17/21 15:40  113 55 118/84 (95) 93   


 


2/17/21 15:30    43/26    


 


2/17/21 15:30  47 20 56/23 (34) 87   


 


2/17/21 15:28  48 20 70/18 (35) 86   


 


2/17/21 15:15  75 20 63/24 (37) 90   


 


2/17/21 15:00 96.5 65 20 76/56 (63) 96   


 


2/17/21 14:53  50 18  100   100


 


2/17/21 14:53  50 18  94 Bi-Pap  100


 


2/17/21 12:00  97      


 


2/17/21 12:00 96.1 94 20 101/44 (63) 4   

















Intake and Output  


 


 2/17/21 2/18/21





 19:00 07:00


 


Intake Total 406.25 ml 1867.50 ml


 


Output Total 30 ml 0 ml


 


Balance 376.25 ml 1867.50 ml


 


  


 


IV Total 406.25 ml 1867.50 ml


 


Output Urine Total 30 ml 0 ml











Current Medications








 Medications


  (Trade)  Dose


 Ordered  Sig/Marleen


 Route


 PRN Reason  Start Time


 Stop Time Status Last Admin


Dose Admin


 


 Chlorhexidine


 Gluconate


  (Nivia-Hex 2%)  1 applic  DAILY@2000


 TOPIC


   2/17/21 20:00


 5/18/21 19:59  2/17/21 20:01





 


 Dextrose


  (Dextrose 50%)  25 ml  Q30M  PRN


 IV


 Hypoglycemia  2/17/21 07:30


 5/18/21 07:29   





 


 Dextrose


  (Dextrose 50%)  50 ml  Q30M  PRN


 IV


 Hypoglycemia  2/17/21 07:30


 5/18/21 07:29  2/17/21 11:42





 


 Diphenhydramine


 HCl


  (Benadryl)  25 mg  Q6H  PRN


 ORAL


 Itching/Pruritis  2/16/21 16:45


 3/18/21 16:44   





 


 Famotidine


  (Pepcid I.v.)  20 mg  Q12HR


 IVP


   2/17/21 16:15


 3/19/21 16:14  2/18/21 09:16





 


 Insulin Aspart


  (NovoLOG)    BEFORE MEALS AND  HS


 SUBQ


   2/17/21 11:30


 5/18/21 11:29   





 


 Lactulose


  (Cephulac)  20 gm  THREE TIMES A  DAY


 ORAL


   2/17/21 09:00


 3/19/21 08:59  2/18/21 09:17





 


 Lorazepam


  (Ativan)  1 mg  Q4H  PRN


 ORAL


 For Anxiety  2/16/21 16:45


 2/23/21 16:44  2/17/21 03:40





 


 Metoprolol


 Tartrate


  (Lopressor)  25 mg  Q12HR


 ORAL


   2/17/21 09:00


 5/18/21 08:59  2/17/21 08:29





 


 Morphine Sulfate


  (Morphine


 Sulfate)  2 mg  Q3H  PRN


 IVP


 Moderate Pain (Pain Scale 4-6)  2/16/21 16:45


 2/23/21 16:44  2/17/21 02:59





 


 Norepinephrine


 Bitartrate 16 mg/


 Dextrose  500 ml @ 0


 mls/hr  Q24H


 IV


   2/17/21 20:15


 2/20/21 20:14  2/18/21 05:00





 


 Ondansetron HCl


  (Zofran)  4 mg  Q6H  PRN


 IVP


 Nausea & Vomiting  2/16/21 16:45


 3/18/21 16:44   





 


 Pantoprazole


  (Protonix)  40 mg  EVERY 12  HOURS


 ORAL


   2/17/21 21:00


 3/19/21 20:59   





 


 Phenylephrine HCl


 100 mg/Dextrose  250 ml @ 0


 mls/hr  Q24H


 IV


   2/17/21 18:00


 2/20/21 17:59  2/18/21 06:00





 


 Piperacillin Sod/


 Tazobactam Sod


 3.375 gm/Sodium


 Chloride  110 ml @ 


 27.5 mls/hr  EVERY 8  HOURS


 IVPB


   2/17/21 10:00


 2/22/21 09:59  2/18/21 04:59





 


 Rifaximin


  (Xifaxan)  550 mg  EVERY 12  HOURS


 ORAL


   2/17/21 09:00


 2/24/21 08:59  2/18/21 09:17





 


 Sodium


 Bicarbonate 100


 ml/Dextrose/


 Sodium Chloride  1,100 ml @ 


 75 mls/hr  [id number]


 IV


   2/17/21 17:00


 3/19/21 16:59  2/18/21 06:00





 


 Temazepam


  (Restoril)  15 mg  DAILYPRN  PRN


 ORAL


 Insomnia  2/16/21 16:45


 2/23/21 16:44   





 


 Vancomycin HCl


  ([facility] pharmacy


 to dose)  1 ea  DAILY  PRN


 MISC


 Per rx protocol  2/17/21 08:00


 3/19/21 07:59   





 


 Vasopressin 100


 units/Sodium


 Chloride  100 ml @ 0


 mls/hr  Q24H


 IV


   2/17/21 19:30


 2/20/21 19:27  2/17/21 21:30








Laboratory Tests


2/17/21 11:39: POC Whole Blood Glucose [Pending]


2/17/21 12:31: POC Whole Blood Glucose 116H


2/17/21 13:21: 


Arterial Blood pH 7.063*L, Arterial Blood Partial Pressure CO2 43.5, Arterial 

Blood Partial Pressure O2 81.2, Arterial Blood HCO3 12.1*L, Arterial Blood 

Oxygen Saturation 91.4L, Arterial Blood Base Excess -17.0*L, Justin Test Positive


2/17/21 17:09: 


Arterial Blood pH 7.073*L, Arterial Blood Partial Pressure CO2 36.9, Arterial 

Blood Partial Pressure O2 212.1H, Arterial Blood HCO3 10.5*L, Arterial Blood 

Oxygen Saturation 99.6, Arterial Blood Base Excess -18.1*L, Justin Test Positive


2/17/21 20:15: 


White Blood Count 15.9H, Red Blood Count 1.65L, Hemoglobin 5.2#*L, Hematocrit 

17.8#L, Mean Corpuscular Volume 108H, Mean Corpuscular Hemoglobin 31.9H, Mean 

Corpuscular Hemoglobin Concent 29.4L, Red Cell Distribution Width 25.1H, 

Platelet Count 71L, Mean Platelet Volume 9.1, Neutrophils (%) (Auto) , 

Lymphocytes (%) (Auto) , Monocytes (%) (Auto) , Eosinophils (%) (Auto) , 

Basophils (%) (Auto) , Differential Total Cells Counted 100, Neutrophils % 

(Manual) 84H, Lymphocytes % (Manual) 3L, Monocytes % (Manual) 4, Eosinophils % 

(Manual) 0, Basophils % (Manual) 0, Band Neutrophils 9H, Platelet Estimate 

DecreasedL, Platelet Morphology Normal, Polychromasia 1+, Hypochromasia 2+, 

Anisocytosis 3+, Macrocytosis 1+, Schistocytes Occasional


2/18/21 04:00: 


White Blood Count 15.1H, Red Blood Count 2.83L, Hemoglobin 8.5#L, Hematocrit 

28.6#L, Mean Corpuscular Volume 101H, Mean Corpuscular Hemoglobin 30.2, Mean 

Corpuscular Hemoglobin Concent 29.9L, Red Cell Distribution Width 25.2H, 

Platelet Count 62L, Mean Platelet Volume 7.9, Neutrophils (%) (Auto) , 

Lymphocytes (%) (Auto) , Monocytes (%) (Auto) , Eosinophils (%) (Auto) , 

Basophils (%) (Auto) , Sodium Level 134L, Potassium Level 5.9H, Chloride Level 

102, Carbon Dioxide Level 11L, Anion Gap 23H, Blood Urea Nitrogen 47H, 

Creatinine 2.9H, Estimat Glomerular Filtration Rate 22.5, Glucose Level 98, Uric

 Acid 7.9H, Calcium Level 7.7L, Phosphorus Level 8.8H, Magnesium Level 2.3, Iron

 Level 50, Total Iron Binding Capacity 73L, Percent Iron Saturation 68H, 

Unsaturated Iron Binding 23L, Ferritin 446H, Total Bilirubin 21.7H, Direct 

Bilirubin 13.1H, Gamma Glutamyl Transpeptidase 47, Aspartate Amino Transf (AST

/SGOT) 5760H, Alanine Aminotransferase (ALT/SGPT) 792H, Alkaline Phosphatase 

512H, Ammonia 78H, Lactate Dehydrogenase 2906H, Troponin I 1.374H, C-Reactive 

Protein, Quantitative 7.5H, Pro-B-Type Natriuretic Peptide 8891H, Total Protein 

5.2L, Albumin 2.1L, Globulin 3.1, Albumin/Globulin Ratio 0.7L, Amylase Level 

1199*H, Lipase > 2000H, Alpha Fetoprotein [Pending], Vitamin B12 Level > 2000H, 

Folate 15.0, Thyroid Stimulating Hormone (TSH) 2.382, Random Vancomycin Level 

20.6, Hepatitis A IgM Antibody [Pending], Hepatitis B Surface Antigen [Pending],

 Hepatitis B Core IgM Antibody [Pending], Hepatitis C Antibody [Pending]


2/18/21 04:34: POC Whole Blood Glucose [Pending]


2/18/21 07:10: 


Prothrombin Time [Pending], Prothromb Time International Ratio [Pending], 

Activated Partial Thromboplast Time [Pending], Carcinoembryonic Antigen 

[Pending], CA 19-9 Antigen [Pending],  Antigen [Pending]


2/18/21 09:32: 


Arterial Blood pH 6.944*L, Arterial Blood Partial Pressure CO2 34.9L, Arterial 

Blood Partial Pressure O2 48.5*L, Arterial Blood HCO3 7.4*L, Arterial Blood 

Oxygen Saturation 73.3*L, Arterial Blood Base Excess -23.3*L, Justin Test N/a


Height (Feet):  5


Height (Inches):  7.00


Weight (Pounds):  175


General Appearance:  no apparent distress, other - Jaundiced


EENT:  other - Intubated on ventilator


Cardiovascular:  normal rate


Respiratory/Chest:  decreased breath sounds


Abdomen:  distended











Alfred Watts MD            Feb 18, 2021 10:30

## 2021-02-18 NOTE — NUR
NURSE NOTES:

2nd PRBC finished infusing. No adverse reaction observed. No gag reflex noted, little to no 
sputum obtained during suctioning.

## 2021-02-18 NOTE — NUR
NURSE NOTES:

1st PRBC finish infusing, no adverse reactions observed. Patient remains on 3 pressors max 
rate on all. Will continue to monitor

## 2021-02-18 NOTE — DIAGNOSTIC IMAGING REPORT
Indication: Cough

 

Technique: One view of the chest

 

Comparison: none

 

Findings: Stable satisfactory tube and line positions. Large right pleural effusion

is unchanged. Right lung consolidation is unchanged. There is increasing left lung

infiltrate.

 

Impression: Worsening left lung infiltrate, over one day

## 2021-02-18 NOTE — EMERGENCY ROOM REPORT
Physical Exam





Vital Signs








  Date Time  Temp Pulse Resp B/P (MAP) Pulse Ox O2 Delivery O2 Flow Rate FiO2


 


21 10:40 97.0 56 14 129/58 (81) 96 Room Air  


 


21 13:11       2.0 100











Medical Decision Making


Diagnostic Impression:  


   Primary Impression:  


   Liver failure


   Qualified Codes:  K72.00 - Acute and subacute hepatic failure without coma


   Additional Impressions:  


   Atrial fibrillation with RVR


   Pleural effusion associated with hepatic disorder


   Coagulopathy


   Leukocytosis


   Qualified Codes:  D72.829 - Elevated white blood cell count, unspecified


   Pancreatitis


   Qualified Codes:  K85.90 - Acute pancreatitis without necrosis or infection, 

   unspecified


   Renal insufficiency


   UTI (urinary tract infection)


   Qualified Codes:  N39.0 - Urinary tract infection, site not specified


ER Course


60-year-old male history of liver failure admitted for multisystem failure in 

ICU.  Intubated, multiple pressors.  Gradually declining vital signs.  Patient 

was made DO NOT RESUSCITATE.  Patient went into asystole approximately 1900.  At

the time of my evaluation the patient remained asystolic.  No palpable pulse.  

Unreadable blood pressure.  Absent heart sounds.  Pronounced  





Last Vital Signs








  Date Time  Temp Pulse Resp B/P (MAP) Pulse Ox O2 Delivery O2 Flow Rate FiO2


 


21 18:00    67/42    


 


21 17:00  68 39     


 


21 16:00      Mechanical Ventilator  


 


21 16:00        100


 


21 16:00 97.0       


 


21 14:30     97   


 


21 13:11       2.0 








Disposition:  


Condition:  


Referrals:  


NON PHYSICIAN (PCP)











Moshe Norman MD              2021 19:32

## 2021-02-18 NOTE — NUR
NURSE NOTES:



Dr. Normna updated at the bedside, made aware the patient is currently having infusion 
Levophed at 30mcg/min double concentration phenylephrine at 240mcg/min and vasopressin at 
0.04to maintain systolic blood pressure above 90. no orders given at this time.

## 2021-02-18 NOTE — NUR
NURSE NOTES:

PCR swabbed taken and sent to lab. Patient repositioned and oral care given, heating 
measures ongoing. Gag absent. Pressors ongoing. NSR on monitor

## 2021-02-18 NOTE — NUR
NURSE NOTES:



Pt's belongings are at bedside which includes clothing only, no Iphone-mobile phone noted as 
documented on paper(belonging's list-form). Documentation also updated on 
intervention(shift-mandatory).

## 2021-02-18 NOTE — SURGERY PROGRESS NOTE
Surgery Progress Note


Subjective


Symptoms:  worse


Additional Comments


declining


heme onc input noted





Objective





Last 24 Hour Vital Signs








  Date Time  Temp Pulse Resp B/P (MAP) Pulse Ox O2 Delivery O2 Flow Rate FiO2


 


2/18/21 10:00  73 21 86/54 (65) 95   


 


2/18/21 09:30  69 27 79/45 (56) 94   


 


2/18/21 09:00  69 25 55/37 (43) 93   


 


2/18/21 09:00  70  75/55    


 


2/18/21 08:30  75 23 77/56 (63) 97   


 


2/18/21 08:00        60


 


2/18/21 08:00 96.5 75 42 85/54 (64) 97   


 


2/18/21 08:00      Mechanical Ventilator  


 


2/18/21 07:30  77 31 91/65 (74) 95   


 


2/18/21 07:04  76 16     100


 


2/18/21 07:00  80 29 82/60 (67) 97   


 


2/18/21 07:00    82/46    


 


2/18/21 06:30  82 26 91/70 (77) 100   


 


2/18/21 06:00    100/65    


 


2/18/21 06:00  82  84/59    


 


2/18/21 06:00  81 30 93/55 (68) 100   


 


2/18/21 05:45  81 23 98/67 (77) 100   


 


2/18/21 05:30  82 27 93/68 (76) 100   


 


2/18/21 05:00  82 19 84/59 (67) 99   


 


2/18/21 05:00    84/59    


 


2/18/21 04:30  83 25 83/57 (66) 97   


 


2/18/21 04:00        60


 


2/18/21 04:00    102/65    


 


2/18/21 04:00 93.0 82 29 106/56 (73) 96   


 


2/18/21 04:00  93      


 


2/18/21 04:00      Mechanical Ventilator  


 


2/18/21 03:30  86 19 90/67 (75) 98   


 


2/18/21 03:23  87 16     60


 


2/18/21 03:00    88/56    


 


2/18/21 03:00  87 48 104/64 (77) 98   


 


2/18/21 02:30  86 35 102/66 (78) 98   


 


2/18/21 02:00    106/70    


 


2/18/21 02:00  91 37 102/70 (81) 98   


 


2/18/21 01:30  89 36 105/62 (76) 98   


 


2/18/21 01:00    100/65    


 


2/18/21 01:00  89 31 99/70 (80) 98   


 


2/18/21 00:30  89 31 99/71 (80) 98   


 


2/18/21 00:00  89 29 100/51 (67) 97   


 


2/18/21 00:00        60


 


2/18/21 00:00    97/69    


 


2/18/21 00:00  91  106/70    


 


2/18/21 00:00      Mechanical Ventilator  


 


2/17/21 23:30  90 26 91/53 (66) 100   


 


2/17/21 23:14  91 16     80


 


2/17/21 23:00    98/58    


 


2/17/21 23:00  90 36 98/55 (69) 100   


 


2/17/21 22:45  82 31 101/58 (72) 99   


 


2/17/21 22:30  91 29 96/60 (72) 100   


 


2/17/21 22:15  91 18 97/65 (76) 100   


 


2/17/21 22:00  90 20 98/51 (67) 100   


 


2/17/21 22:00    98/51    


 


2/17/21 21:45  93 16 106/65 (79) 100   


 


2/17/21 21:30  87 31 93/57 (69) 100   


 


2/17/21 21:15  86 16 86/50 (62) 100   


 


2/17/21 21:00    80/46    


 


2/17/21 21:00  87 33 80/46 (57) 100   


 


2/17/21 20:45  85 33 80/21 (40) 100   


 


2/17/21 20:36    73/13    


 


2/17/21 20:30  89 30 84/52 (63) 100   


 


2/17/21 20:15  92 16 100/70 (80) 100   


 


2/17/21 20:00  89      


 


2/17/21 20:00 95.0 91 19 89/55 (66) 100   


 


2/17/21 20:00      Mechanical Ventilator  


 


2/17/21 20:00        80


 


2/17/21 19:51  92 31 81/55 (64) 100   


 


2/17/21 19:45  92 34 72/42 (52) 100   


 


2/17/21 19:30  93 37 88/56 (67) 100   


 


2/17/21 19:29  94 16     80


 


2/17/21 19:26  85  73/13    


 


2/17/21 19:21  94 30 101/58 (72) 100   


 


2/17/21 19:20  94 30 88/55 (66) 100   


 


2/17/21 19:15  91 32 99/70 (80) 100   


 


2/17/21 19:00  83 25 89/57 (68) 100   


 


2/17/21 18:45  83 25 89/57 (68) 100   


 


2/17/21 18:42  85  73/13    


 


2/17/21 18:30  87 30 73/13 (33) 100   


 


2/17/21 18:21    65/25    


 


2/17/21 18:15  90 31 65/25 (38) 100   


 


2/17/21 18:00  93 33 75/36 (49) 100   


 


2/17/21 17:45  96 30 85/42 (56) 100   


 


2/17/21 17:30  98 30 88/62 (71) 100   


 


2/17/21 17:25        80


 


2/17/21 17:15  91 18 78/46 (57) 100   


 


2/17/21 17:00  98 21 70/55 (60) 100   


 


2/17/21 16:49  99 22 70/51 (57) 100   


 


2/17/21 16:45  102 22 73/50 (58) 97   


 


2/17/21 16:30  106 23 82/68 (73) 97   


 


2/17/21 16:20  111 33 123/80 (94) 95   


 


2/17/21 16:10  116 34 143/75 (97) 63   


 


2/17/21 16:05  112 20     100


 


2/17/21 16:00      Mechanical Ventilator  


 


2/17/21 16:00 96.3 57 29 170/109 (129) 93   


 


2/17/21 15:50  97 22 43/26 (32) 93   


 


2/17/21 15:40  113 55 118/84 (95) 93   


 


2/17/21 15:30    43/26    


 


2/17/21 15:30  47 20 56/23 (34) 87   


 


2/17/21 15:28  48 20 70/18 (35) 86   


 


2/17/21 15:15  75 20 63/24 (37) 90   


 


2/17/21 15:00 96.5 65 20 76/56 (63) 96   


 


2/17/21 14:53  50 18  100   100


 


2/17/21 14:53  50 18  94 Bi-Pap  100


 


2/17/21 12:00  97      


 


2/17/21 12:00 96.1 94 20 101/44 (63) 4   








I&O











Intake and Output  


 


 2/17/21 2/18/21





 19:00 07:00


 


Intake Total 406.25 ml 1867.50 ml


 


Output Total 30 ml 0 ml


 


Balance 376.25 ml 1867.50 ml


 


  


 


IV Total 406.25 ml 1867.50 ml


 


Output Urine Total 30 ml 0 ml








Dressing:  other


Wound:  other


Cardiovascular:  RSR


Respiratory:  decreased breath sounds


Abdomen:  soft, distended, non-tender, decreased bowel sounds


Extremities:  edema





Laboratory Tests








Test


 2/17/21


11:39 2/17/21


12:31 2/17/21


13:21 2/17/21


17:09


 


POC Whole Blood Glucose


 Pending  


 116 MG/DL


()  H 


 





 


Arterial Blood pH


 


 


 7.063


(7.350-7.450) 7.073


(7.350-7.450)


 


Arterial Blood Partial


Pressure CO2 


 


 43.5 mmHg


(35.0-45.0) 36.9 mmHg


(35.0-45.0)


 


Arterial Blood Partial


Pressure O2 


 


 81.2 mmHg


(75.0-100.0) 212.1 mmHg


(75.0-100.0)  H


 


Arterial Blood HCO3


 


 


 12.1 mmol/L


(22.0-26.0)  *L 10.5 mmol/L


(22.0-26.0)  *L


 


Arterial Blood Oxygen


Saturation 


 


 91.4 %


()  L 99.6 %


()


 


Arterial Blood Base Excess


 


 


 -17.0 (-2-2)


*L -18.1 (-2-2)


*L


 


Justin Test   Positive   Positive  


 


Test


 2/17/21


20:15 2/18/21


04:00 2/18/21


04:34 2/18/21


07:10


 


White Blood Count


 15.9 K/UL


(4.8-10.8)  H 15.1 K/UL


(4.8-10.8)  H 


 





 


Red Blood Count


 1.65 M/UL


(4.70-6.10)  L 2.83 M/UL


(4.70-6.10)  L 


 





 


Hemoglobin


 5.2 G/DL


(14.2-18.0) 8.5 G/DL


(14.2-18.0)  #L 


 





 


Hematocrit


 17.8 %


(42.0-52.0)  #L 28.6 %


(42.0-52.0)  #L 


 





 


Mean Corpuscular Volume


 108 FL (80-99)


H 101 FL (80-99)


H 


 





 


Mean Corpuscular Hemoglobin


 31.9 PG


(27.0-31.0)  H 30.2 PG


(27.0-31.0) 


 





 


Mean Corpuscular Hemoglobin


Concent 29.4 G/DL


(32.0-36.0)  L 29.9 G/DL


(32.0-36.0)  L 


 





 


Red Cell Distribution Width


 25.1 %


(11.6-14.8)  H 25.2 %


(11.6-14.8)  H 


 





 


Platelet Count


 71 K/UL


(150-450)  L 62 K/UL


(150-450)  L 


 





 


Mean Platelet Volume


 9.1 FL


(6.5-10.1) 7.9 FL


(6.5-10.1) 


 





 


Neutrophils (%) (Auto)


 % (45.0-75.0)


 % (45.0-75.0)


 


 





 


Lymphocytes (%) (Auto)


 % (20.0-45.0)


 % (20.0-45.0)


 


 





 


Monocytes (%) (Auto)  % (1.0-10.0)    % (1.0-10.0)    


 


Eosinophils (%) (Auto)  % (0.0-3.0)    % (0.0-3.0)    


 


Basophils (%) (Auto)  % (0.0-2.0)    % (0.0-2.0)    


 


Differential Total Cells


Counted 100  


 


 


 





 


Neutrophils % (Manual) 84 % (45-75)  H   


 


Lymphocytes % (Manual) 3 % (20-45)  L   


 


Monocytes % (Manual) 4 % (1-10)     


 


Eosinophils % (Manual) 0 % (0-3)     


 


Basophils % (Manual) 0 % (0-2)     


 


Band Neutrophils 9 % (0-8)  H   


 


Platelet Estimate Decreased  L   


 


Platelet Morphology Normal     


 


Polychromasia 1+     


 


Hypochromasia 2+     


 


Anisocytosis 3+     


 


Macrocytosis 1+     


 


Schistocytes Occasional     


 


Sodium Level


 


 134 MMOL/L


(136-145)  L 


 





 


Potassium Level


 


 5.9 MMOL/L


(3.5-5.1)  H 


 





 


Chloride Level


 


 102 MMOL/L


() 


 





 


Carbon Dioxide Level


 


 11 MMOL/L


(21-32)  L 


 





 


Anion Gap


 


 23 mmol/L


(5-15)  H 


 





 


Blood Urea Nitrogen


 


 47 mg/dL


(7-18)  H 


 





 


Creatinine


 


 2.9 MG/DL


(0.55-1.30)  H 


 





 


Estimat Glomerular Filtration


Rate 


 22.5 mL/min


(>60) 


 





 


Glucose Level


 


 98 MG/DL


() 


 





 


Uric Acid


 


 7.9 MG/DL


(2.6-7.2)  H 


 





 


Calcium Level


 


 7.7 MG/DL


(8.5-10.1)  L 


 





 


Phosphorus Level


 


 8.8 MG/DL


(2.5-4.9)  H 


 





 


Magnesium Level


 


 2.3 MG/DL


(1.8-2.4) 


 





 


Iron Level


 


 50 ug/dL


() 


 





 


Total Iron Binding Capacity


 


 73 ug/dL


(250-450)  L 


 





 


Percent Iron Saturation  68 % (15-50)  H  


 


Unsaturated Iron Binding


 


 23 ug/dL


(112-346)  L 


 





 


Ferritin


 


 446 NG/ML


(8-388)  H 


 





 


Total Bilirubin


 


 21.7 MG/DL


(0.2-1.0)  H 


 





 


Direct Bilirubin


 


 13.1 MG/DL


(0.0-0.3)  H 


 





 


Gamma Glutamyl Transpeptidase  47 U/L (5-85)    


 


Aspartate Amino Transf


(AST/SGOT) 


 5760 U/L


(15-37)  H 


 





 


Alanine Aminotransferase


(ALT/SGPT) 


 792 U/L


(12-78)  H 


 





 


Alkaline Phosphatase


 


 512 U/L


()  H 


 





 


Ammonia


 


 78 umol/L


(11-32)  H 


 





 


Lactate Dehydrogenase


 


 2906 U/L


()  H 


 





 


Troponin I


 


 1.374 ng/mL


(0.000-0.056) 


 





 


C-Reactive Protein,


Quantitative 


 7.5 mg/dL


(0.00-0.90)  H 


 





 


Pro-B-Type Natriuretic Peptide


 


 8891 pg/mL


(0-125)  H 


 





 


Total Protein


 


 5.2 G/DL


(6.4-8.2)  L 


 





 


Albumin


 


 2.1 G/DL


(3.4-5.0)  L 


 





 


Globulin  3.1 g/dL    


 


Albumin/Globulin Ratio


 


 0.7 (1.0-2.7)


L 


 





 


Amylase Level


 


 1199 U/L


()  *H 


 





 


Lipase


 


 > 2000 U/L


()  H 


 





 


Alpha Fetoprotein  Pending    


 


Vitamin B12 Level


 


 > 2000 PG/ML


(193-986)  H 


 





 


Folate


 


 15.0 NG/ML


(8.6-58.9) 


 





 


Thyroid Stimulating Hormone


(TSH) 


 2.382 uiU/mL


(0.358-3.740) 


 





 


Random Vancomycin Level  20.6 ug/mL    


 


Hepatitis A IgM Antibody  Pending    


 


Hepatitis B Surface Antigen  Pending    


 


Hepatitis B Core IgM Antibody  Pending    


 


Hepatitis C Antibody  Pending    


 


POC Whole Blood Glucose   Pending   


 


Prothrombin Time


 


 


 


 > 100.0 SEC


(9.30-11.50)  H


 


Prothromb Time International


Ratio 


 


 


 > 10.0


(0.9-1.1)  *H


 


Activated Partial


Thromboplast Time 


 


 


 > 150 SEC


(23-33)  *H


 


Carcinoembryonic Antigen    Pending  


 


CA 19-9 Antigen    Pending  


 


 Antigen    Pending  


 


Test


 2/18/21


09:32 


 


 





 


Arterial Blood pH


 6.944


(7.350-7.450) 


 


 





 


Arterial Blood Partial


Pressure CO2 34.9 mmHg


(35.0-45.0)  L 


 


 





 


Arterial Blood Partial


Pressure O2 48.5 mmHg


(75.0-100.0) 


 


 





 


Arterial Blood HCO3


 7.4 mmol/L


(22.0-26.0)  *L 


 


 





 


Arterial Blood Oxygen


Saturation 73.3 %


()  *L 


 


 





 


Arterial Blood Base Excess


 -23.3 (-2-2)


*L 


 


 





 


Justin Test N/a     











Plan


Problems:  


(1) UTI (urinary tract infection)


(2) Coagulopathy


(3) Leukocytosis


(4) Pancreatitis


Assessment & Plan:  60-year-old male with liver disease now pancreatitis.  

Abdominal pain.  Labs noted imaging reviewed.  No acute surgical intervention 

necessary at this time.  GI eval liver eval n.p.o. IV fluids trend labs we will 

follow with you on exam recommendations thank you for letting present patient's 

care


Liver cancer.  The patient has ascites, CT 6.7 cm round exophytic mass arising 

from segment 4A of the liver. This is presumably related to stated clinical 

history of liver cancer. Surgical clips are seen adjacent to this mass.


--> known history


--> in prior was on hospice


There are


colonic diverticula. No definite evidence of diverticulitis. The appendix is 

normal.


There is a moderate amount of free intraperitoneal fluid. No free 

intraperitoneal


gas. The stomach and duodenum are unremarkable.


 


Lack of IV contrast limits assessment of the solid organs. The liver is very 

atrophic


and demonstrates extensive surface nodularity. Surgical clips are seen at the


inferior aspect of the left hepatic lobe. Arising from segment 4A, there is a 

round


exophytic mass which measures approximately 6.7 cm in diameter. This 

demonstrates


some peripheral calcification. Large perigastric and periesophageal varices are


demonstrated. There is a moderate amount of ascites fluid. A 

ventriculoperitoneal


shunt catheter is seen coursing through the peritoneal space and has its tip in 

the


pelvis.


 


The pancreas, spleen, adrenals, kidneys are grossly unremarkable. No 

retroperitoneal


or mesenteric mass or adenopathy. No pelvic mass or adenopathy.


 


There is bilateral gynecomastia. There is diffuse edema of the subcutaneous and


abdominal fat. There is a massive right pleural effusion. This results in co

mplete


atelectasis of the right lower lobe. The included left lung base demonstrates 

some


atelectasis, no pleural fluid. The heart is borderline large. The bones are


unremarkable for age.


 


Impression: Evidence of hepatic cirrhosis, with atrophy and surface nodularity


 


6.7 cm round exophytic mass arising from segment 4A of the liver. This is 

presumably


related to stated clinical history of liver cancer. Surgical clips are seen 

adjacent


to this mass.


 


Evidence of portal hypertension, with large periesophageal and perigastric 

varices


 


Moderate ascites, likely related to liver disease. However, there is also a


ventriculoperitoneal shunt catheter which may be contributing some volume to the


ascites.


 


There is evidence of anasarca, including massive right pleural effusion, diffuse


edema of the subcutaneous and abdominal fat limiting assessment of the GI tract,

due


to lack of enteric contrast administration. No gross acute GI pathology.


 


Colonic diverticulosis. No evidence of diverticulitis


 


Borderline cardiomegaly


 


Incidental finding bilateral gynecomastia





(5) Renal insufficiency


(6) Liver failure


(7) Atrial fibrillation with RVR











RosalbajohnnyTino                Feb 18, 2021 11:14

## 2021-02-18 NOTE — NUR
NURSE NOTES:



Dr. Briggs informed that patient blood pressure has dropped to the 67/49 with heart rate 
of 60-74in sinus rhythm. informed the patient is currently running Levophed at 30mcg/min, 
phenylephrine at 240mcg/min and vasopressin at 0.04units/hr. no additional order obtained to 
add an additional medications.

## 2021-02-18 NOTE — CONSULTATION
History of Present Illness


General


Chief Complaint:  Abdominal Pain





Present Illness


Allergies:  


Coded Allergies:  


     CEPHALEXIN (Verified  Allergy, Unknown, 2/16/21)





Medication History


Scheduled


Atorvastatin Calcium* (Atorvastatin Calcium*), 40 MG ORAL BEDTIME, (Reported)


Metoprolol Tartrate* (Metoprolol Tartrate*), 25 MG ORAL EVERY 12 HOURS, 

(Reported)





Scheduled PRN


Ondansetron (Zofran), 4 MG ORAL Q8HR PRN for Nausea & Vomiting, (Reported)





Patient History


Healthcare decision maker





Resuscitation status





Advanced Directive on File








Physical Exam





Last 24 Hour Vital Signs








  Date Time  Temp Pulse Resp B/P (MAP) Pulse Ox O2 Delivery O2 Flow Rate FiO2


 


2/18/21 06:00    100/65    


 


2/18/21 06:00  82  84/59    


 


2/18/21 06:00  81 30 93/55 (68) 100   


 


2/18/21 05:45  81 23 98/67 (77) 100   


 


2/18/21 05:30  82 27 93/68 (76) 100   


 


2/18/21 05:00  82 19 84/59 (67) 99   


 


2/18/21 05:00    84/59    


 


2/18/21 04:30  83 25 83/57 (66) 97   


 


2/18/21 04:00        60


 


2/18/21 04:00    102/65    


 


2/18/21 04:00 93.0 82 29 106/56 (73) 96   


 


2/18/21 04:00  93      


 


2/18/21 04:00      Mechanical Ventilator  


 


2/18/21 03:30  86 19 90/67 (75) 98   


 


2/18/21 03:23  87 16     60


 


2/18/21 03:00    88/56    


 


2/18/21 03:00  87 48 104/64 (77) 98   


 


2/18/21 02:30  86 35 102/66 (78) 98   


 


2/18/21 02:00    106/70    


 


2/18/21 02:00  91 37 102/70 (81) 98   


 


2/18/21 01:30  89 36 105/62 (76) 98   


 


2/18/21 01:00    100/65    


 


2/18/21 01:00  89 31 99/70 (80) 98   


 


2/18/21 00:30  89 31 99/71 (80) 98   


 


2/18/21 00:00  89 29 100/51 (67) 97   


 


2/18/21 00:00        60


 


2/18/21 00:00    97/69    


 


2/18/21 00:00  91  106/70    


 


2/18/21 00:00      Mechanical Ventilator  


 


2/17/21 23:30  90 26 91/53 (66) 100   


 


2/17/21 23:14  91 16     80


 


2/17/21 23:00    98/58    


 


2/17/21 23:00  90 36 98/55 (69) 100   


 


2/17/21 22:45  82 31 101/58 (72) 99   


 


2/17/21 22:30  91 29 96/60 (72) 100   


 


2/17/21 22:15  91 18 97/65 (76) 100   


 


2/17/21 22:00  90 20 98/51 (67) 100   


 


2/17/21 22:00    98/51    


 


2/17/21 21:45  93 16 106/65 (79) 100   


 


2/17/21 21:30  87 31 93/57 (69) 100   


 


2/17/21 21:15  86 16 86/50 (62) 100   


 


2/17/21 21:00    80/46    


 


2/17/21 21:00  87 33 80/46 (57) 100   


 


2/17/21 20:45  85 33 80/21 (40) 100   


 


2/17/21 20:36    73/13    


 


2/17/21 20:30  89 30 84/52 (63) 100   


 


2/17/21 20:15  92 16 100/70 (80) 100   


 


2/17/21 20:00  89      


 


2/17/21 20:00 95.0 91 19 89/55 (66) 100   


 


2/17/21 20:00      Mechanical Ventilator  


 


2/17/21 20:00        80


 


2/17/21 19:51  92 31 81/55 (64) 100   


 


2/17/21 19:45  92 34 72/42 (52) 100   


 


2/17/21 19:30  93 37 88/56 (67) 100   


 


2/17/21 19:29  94 16     80


 


2/17/21 19:26  85  73/13    


 


2/17/21 19:21  94 30 101/58 (72) 100   


 


2/17/21 19:20  94 30 88/55 (66) 100   


 


2/17/21 19:15  91 32 99/70 (80) 100   


 


2/17/21 19:00  83 25 89/57 (68) 100   


 


2/17/21 18:45  83 25 89/57 (68) 100   


 


2/17/21 18:42  85  73/13    


 


2/17/21 18:30  87 30 73/13 (33) 100   


 


2/17/21 18:21    65/25    


 


2/17/21 18:15  90 31 65/25 (38) 100   


 


2/17/21 18:00  93 33 75/36 (49) 100   


 


2/17/21 17:45  96 30 85/42 (56) 100   


 


2/17/21 17:30  98 30 88/62 (71) 100   


 


2/17/21 17:25        80


 


2/17/21 17:15  91 18 78/46 (57) 100   


 


2/17/21 17:00  98 21 70/55 (60) 100   


 


2/17/21 16:49  99 22 70/51 (57) 100   


 


2/17/21 16:45  102 22 73/50 (58) 97   


 


2/17/21 16:30  106 23 82/68 (73) 97   


 


2/17/21 16:20  111 33 123/80 (94) 95   


 


2/17/21 16:10  116 34 143/75 (97) 63   


 


2/17/21 16:05  112 20     100


 


2/17/21 16:00      Mechanical Ventilator  


 


2/17/21 16:00 96.3 57 29 170/109 (129) 93   


 


2/17/21 15:50  97 22 43/26 (32) 93   


 


2/17/21 15:40  113 55 118/84 (95) 93   


 


2/17/21 15:30    43/26    


 


2/17/21 15:30  47 20 56/23 (34) 87   


 


2/17/21 15:28  48 20 70/18 (35) 86   


 


2/17/21 15:15  75 20 63/24 (37) 90   


 


2/17/21 15:00 96.5 65 20 76/56 (63) 96   


 


2/17/21 14:53  50 18  100   100


 


2/17/21 14:53  50 18  94 Bi-Pap  100


 


2/17/21 12:00  97      


 


2/17/21 12:00 96.1 94 20 101/44 (63) 4   


 


2/17/21 09:00      Room Air  


 


2/17/21 08:29  134  114/63    


 


2/17/21 08:00 96.6 134 20 114/63 (80) 99   


 


2/17/21 08:00  148      


 


2/17/21 07:24  136      

















Intake and Output  


 


 2/17/21 2/18/21





 19:00 07:00


 


Intake Total 406.25 ml 1670.35 ml


 


Output Total 30 ml 0 ml


 


Balance 376.25 ml 1670.35 ml


 


  


 


IV Total 406.25 ml 1670.35 ml


 


Output Urine Total 30 ml 0 ml











Laboratory Tests








Test


 2/17/21


08:40 2/17/21


13:21 2/17/21


17:09 2/17/21


20:15


 


White Blood Count


 22.6 K/UL


(4.8-10.8)  *H 


 


 15.9 K/UL


(4.8-10.8)  H


 


Red Blood Count


 3.12 M/UL


(4.70-6.10)  L 


 


 1.65 M/UL


(4.70-6.10)  L


 


Hemoglobin


 9.6 G/DL


(14.2-18.0)  L 


 


 5.2 G/DL


(14.2-18.0)


 


Hematocrit


 32.5 %


(42.0-52.0)  L 


 


 17.8 %


(42.0-52.0)  #L


 


Mean Corpuscular Volume


 104 FL (80-99)


H 


 


 108 FL (80-99)


H


 


Mean Corpuscular Hemoglobin


 30.6 PG


(27.0-31.0) 


 


 31.9 PG


(27.0-31.0)  H


 


Mean Corpuscular Hemoglobin


Concent 29.4 G/DL


(32.0-36.0)  L 


 


 29.4 G/DL


(32.0-36.0)  L


 


Red Cell Distribution Width


 24.4 %


(11.6-14.8)  H 


 


 25.1 %


(11.6-14.8)  H


 


Platelet Count


 120 K/UL


(150-450)  L 


 


 71 K/UL


(150-450)  L


 


Mean Platelet Volume


 9.4 FL


(6.5-10.1) 


 


 9.1 FL


(6.5-10.1)


 


Neutrophils (%) (Auto)


 % (45.0-75.0)


 


 


 % (45.0-75.0)





 


Lymphocytes (%) (Auto)


 % (20.0-45.0)


 


 


 % (20.0-45.0)





 


Monocytes (%) (Auto)  % (1.0-10.0)      % (1.0-10.0)  


 


Eosinophils (%) (Auto)  % (0.0-3.0)      % (0.0-3.0)  


 


Basophils (%) (Auto)  % (0.0-2.0)      % (0.0-2.0)  


 


Differential Total Cells


Counted 100  


 


 


 100  





 


Neutrophils % (Manual) 84 % (45-75)  H   84 % (45-75)  H


 


Lymphocytes % (Manual) 4 % (20-45)  L   3 % (20-45)  L


 


Monocytes % (Manual) 10 % (1-10)     4 % (1-10)  


 


Eosinophils % (Manual) 0 % (0-3)     0 % (0-3)  


 


Basophils % (Manual) 0 % (0-2)     0 % (0-2)  


 


Band Neutrophils 2 % (0-8)     9 % (0-8)  H


 


Platelet Estimate Decreased  L   Decreased  L


 


Platelet Morphology Normal     Normal  


 


Hypochromasia 1+     2+  


 


Anisocytosis 2+     3+  


 


Macrocytosis 1+     1+  


 


Hemoglobin A1c


 5.3 %


(4.3-6.0) 


 


 





 


Lactic Acid Level


 7.80 mmol/L


(0.66-2.22)  H 


 


 





 


Arterial Blood pH


 


 7.063


(7.350-7.450) 7.073


(7.350-7.450) 





 


Arterial Blood Partial


Pressure CO2 


 43.5 mmHg


(35.0-45.0) 36.9 mmHg


(35.0-45.0) 





 


Arterial Blood Partial


Pressure O2 


 81.2 mmHg


(75.0-100.0) 212.1 mmHg


(75.0-100.0)  H 





 


Arterial Blood HCO3


 


 12.1 mmol/L


(22.0-26.0)  *L 10.5 mmol/L


(22.0-26.0)  *L 





 


Arterial Blood Oxygen


Saturation 


 91.4 %


()  L 99.6 %


() 





 


Arterial Blood Base Excess


 


 -17.0 (-2-2)


*L -18.1 (-2-2)


*L 





 


Justin Test  Positive   Positive   


 


Polychromasia    1+  


 


Schistocytes    Occasional  


 


Test


 2/18/21


04:00 


 


 





 


White Blood Count


 15.1 K/UL


(4.8-10.8)  H 


 


 





 


Red Blood Count


 2.83 M/UL


(4.70-6.10)  L 


 


 





 


Hemoglobin


 8.5 G/DL


(14.2-18.0)  #L 


 


 





 


Hematocrit


 28.6 %


(42.0-52.0)  #L 


 


 





 


Mean Corpuscular Volume


 101 FL (80-99)


H 


 


 





 


Mean Corpuscular Hemoglobin


 30.2 PG


(27.0-31.0) 


 


 





 


Mean Corpuscular Hemoglobin


Concent 29.9 G/DL


(32.0-36.0)  L 


 


 





 


Red Cell Distribution Width


 25.2 %


(11.6-14.8)  H 


 


 





 


Platelet Count


 62 K/UL


(150-450)  L 


 


 





 


Mean Platelet Volume


 7.9 FL


(6.5-10.1) 


 


 





 


Neutrophils (%) (Auto)


 % (45.0-75.0)


 


 


 





 


Lymphocytes (%) (Auto)


 % (20.0-45.0)


 


 


 





 


Monocytes (%) (Auto)  % (1.0-10.0)     


 


Eosinophils (%) (Auto)  % (0.0-3.0)     


 


Basophils (%) (Auto)  % (0.0-2.0)     


 


Prothrombin Time Pending     


 


Prothromb Time International


Ratio Pending  


 


 


 





 


Activated Partial


Thromboplast Time Pending  


 


 


 





 


Sodium Level


 134 MMOL/L


(136-145)  L 


 


 





 


Potassium Level


 5.9 MMOL/L


(3.5-5.1)  H 


 


 





 


Chloride Level


 102 MMOL/L


() 


 


 





 


Carbon Dioxide Level


 11 MMOL/L


(21-32)  L 


 


 





 


Anion Gap


 23 mmol/L


(5-15)  H 


 


 





 


Blood Urea Nitrogen


 47 mg/dL


(7-18)  H 


 


 





 


Creatinine


 2.9 MG/DL


(0.55-1.30)  H 


 


 





 


Estimat Glomerular Filtration


Rate 22.5 mL/min


(>60) 


 


 





 


Glucose Level


 98 MG/DL


() 


 


 





 


Uric Acid


 7.9 MG/DL


(2.6-7.2)  H 


 


 





 


Calcium Level


 7.7 MG/DL


(8.5-10.1)  L 


 


 





 


Phosphorus Level


 8.8 MG/DL


(2.5-4.9)  H 


 


 





 


Magnesium Level


 2.3 MG/DL


(1.8-2.4) 


 


 





 


Iron Level


 50 ug/dL


() 


 


 





 


Total Iron Binding Capacity


 73 ug/dL


(250-450)  L 


 


 





 


Percent Iron Saturation 68 % (15-50)  H   


 


Unsaturated Iron Binding


 23 ug/dL


(112-346)  L 


 


 





 


Ferritin


 446 NG/ML


(8-388)  H 


 


 





 


Total Bilirubin


 21.7 MG/DL


(0.2-1.0)  H 


 


 





 


Direct Bilirubin


 13.1 MG/DL


(0.0-0.3)  H 


 


 





 


Gamma Glutamyl Transpeptidase 47 U/L (5-85)     


 


Aspartate Amino Transf


(AST/SGOT) 5760 U/L


(15-37)  H 


 


 





 


Alanine Aminotransferase


(ALT/SGPT) 792 U/L


(12-78)  H 


 


 





 


Alkaline Phosphatase


 512 U/L


()  H 


 


 





 


Ammonia


 78 umol/L


(11-32)  H 


 


 





 


Lactate Dehydrogenase


 2906 U/L


()  H 


 


 





 


Troponin I


 1.374 ng/mL


(0.000-0.056) 


 


 





 


C-Reactive Protein,


Quantitative 7.5 mg/dL


(0.00-0.90)  H 


 


 





 


Pro-B-Type Natriuretic Peptide


 8891 pg/mL


(0-125)  H 


 


 





 


Total Protein


 5.2 G/DL


(6.4-8.2)  L 


 


 





 


Albumin


 2.1 G/DL


(3.4-5.0)  L 


 


 





 


Globulin 3.1 g/dL     


 


Albumin/Globulin Ratio


 0.7 (1.0-2.7)


L 


 


 





 


Amylase Level


 1199 U/L


()  *H 


 


 





 


Lipase


 > 2000 U/L


()  H 


 


 





 


Alpha Fetoprotein Pending     


 


Vitamin B12 Level Pending     


 


Folate Pending     


 


Thyroid Stimulating Hormone


(TSH) 2.382 uiU/mL


(0.358-3.740) 


 


 





 


Random Vancomycin Level 20.6 ug/mL     


 


Hepatitis A IgM Antibody Pending     


 


Hepatitis B Surface Antigen Pending     


 


Hepatitis B Core IgM Antibody Pending     


 


Hepatitis C Antibody Pending     








Height (Feet):  5


Height (Inches):  7.00


Weight (Pounds):  175


Medications





Current Medications








 Medications


  (Trade)  Dose


 Ordered  Sig/Marleen


 Route


 PRN Reason  Start Time


 Stop Time Status Last Admin


Dose Admin


 


 Chlorhexidine


 Gluconate


  (Nivia-Hex 2%)  1 applic  DAILY@2000


 TOPIC


   2/17/21 20:00


 5/18/21 19:59  2/17/21 20:01





 


 Dextrose


  (Dextrose 50%)  25 ml  Q30M  PRN


 IV


 Hypoglycemia  2/17/21 07:30


 5/18/21 07:29   





 


 Dextrose


  (Dextrose 50%)  50 ml  Q30M  PRN


 IV


 Hypoglycemia  2/17/21 07:30


 5/18/21 07:29  2/17/21 11:42





 


 Diphenhydramine


 HCl


  (Benadryl)  25 mg  Q6H  PRN


 ORAL


 Itching/Pruritis  2/16/21 16:45


 3/18/21 16:44   





 


 Famotidine


  (Pepcid I.v.)  20 mg  Q12HR


 IVP


   2/17/21 16:15


 3/19/21 16:14   





 


 Insulin Aspart


  (NovoLOG)    BEFORE MEALS AND  HS


 SUBQ


   2/17/21 11:30


 5/18/21 11:29   





 


 Lactulose


  (Cephulac)  20 gm  THREE TIMES A  DAY


 ORAL


   2/17/21 09:00


 3/19/21 08:59  2/17/21 08:51





 


 Lorazepam


  (Ativan)  1 mg  Q4H  PRN


 ORAL


 For Anxiety  2/16/21 16:45


 2/23/21 16:44  2/17/21 03:40





 


 Metoprolol


 Tartrate


  (Lopressor)  25 mg  Q12HR


 ORAL


   2/17/21 09:00


 5/18/21 08:59  2/17/21 08:29





 


 Morphine Sulfate


  (Morphine


 Sulfate)  2 mg  Q3H  PRN


 IVP


 Moderate Pain (Pain Scale 4-6)  2/16/21 16:45


 2/23/21 16:44  2/17/21 02:59





 


 Norepinephrine


 Bitartrate 16 mg/


 Dextrose  500 ml @ 0


 mls/hr  Q24H


 IV


   2/17/21 20:15


 2/20/21 20:14  2/18/21 05:00





 


 Ondansetron HCl


  (Zofran)  4 mg  Q6H  PRN


 IVP


 Nausea & Vomiting  2/16/21 16:45


 3/18/21 16:44   





 


 Pantoprazole


  (Protonix)  40 mg  EVERY 12  HOURS


 ORAL


   2/17/21 21:00


 3/19/21 20:59   





 


 Phenylephrine HCl


 100 mg/Dextrose  250 ml @ 0


 mls/hr  Q24H


 IV


   2/17/21 18:00


 2/20/21 17:59  2/18/21 06:00





 


 Piperacillin Sod/


 Tazobactam Sod


 3.375 gm/Sodium


 Chloride  110 ml @ 


 27.5 mls/hr  EVERY 8  HOURS


 IVPB


   2/17/21 10:00


 2/22/21 09:59  2/18/21 04:59





 


 Rifaximin


  (Xifaxan)  550 mg  EVERY 12  HOURS


 ORAL


   2/17/21 09:00


 2/24/21 08:59  2/17/21 08:51





 


 Sodium


 Bicarbonate 100


 ml/Dextrose/


 Sodium Chloride  1,100 ml @ 


 75 mls/hr  [id number]


 IV


   2/17/21 17:00


 3/19/21 16:59  2/18/21 06:00





 


 Temazepam


  (Restoril)  15 mg  DAILYPRN  PRN


 ORAL


 Insomnia  2/16/21 16:45


 2/23/21 16:44   





 


 Vancomycin HCl


  ([facility] pharmacy


 to dose)  1 ea  DAILY  PRN


 MISC


 Per rx protocol  2/17/21 08:00


 3/19/21 07:59   





 


 Vasopressin 100


 units/Sodium


 Chloride  100 ml @ 0


 mls/hr  Q24H


 IV


   2/17/21 19:30


 2/20/21 19:27  2/17/21 21:30














Assessment/Plan


Assessment/Plan:


Hematology Consultation





REQ MD: Jacqueline Harper


RFC: Pancytopenia and liver mass


DOS: 2/18/2021





HPI


60y old male, presents complaining of total body pain.  He is also jaundiced.  

Apparently was recently discharged from the hospital.  The pain throughout his 

body is severe.  He was discharged on Dilaudid.  Unknown whether he has been 

receiving this medication. He has a history of liver cancer.  There is a 

suggestion that he was discharged to hospice.  There is no clear documentation 

of this.  However family contacted his private doctor and requested repeat 

evaluation and therefore he was brought to the emergency department.





History of diabetes, hypertension and cirrhosis. Patient denies fevers or 

chills. Review of outside records reveal that he has had atrial fibrillation 

with rapid ventricular rate. Patient denies dysuria.  He has had loose stools 

without passing any blood or melena.  He is felt nauseated but denies vomiting. 

There is no coffee-ground or bloody vomitus. The patient has a nonproductive 

cough.  He does not feel short of breath at this time. This was obtained from ER

admission, but he coded yesterday and is on a vent.





Coded Allergies:  


     CEPHALEXIN (Verified  Allergy, Unknown, 2/16/21)





COVID-19 Screening


Contact w/high risk pt:  No


Experienced COVID-19 symptoms?:  No


COVID-19 Testing performed PTA:  No





Patient History


Past Medical History:  see triage record, other - Varices, liver mass


Past Surgical History:  other - shunt


Social History:  Denies: smoking


Social History Narrative


With family


Reviewed Nursing Documentation:  PMH: Agreed; PSxH: Agreed





Nursing Documentation-PMH


Past Medical History:  No History, Except For


Hx Hypertension:  Yes


Hx Diabetes:  Yes


Hx Cancer:  Yes - liver


Hx Gastrointestinal Problems:  Yes - liver cirrosis





Review of Systems


All Other Systems:  negative except mentioned in HPI





Physical Exam


Sp02 EP Interpretation:  reviewed, normal


General:  mild distress, other - Jaundiced in mild distress, Chronically Ill


Eyes:  bilateral eye PERRL, bilateral eye EOMI, bilateral eye scleral icterus


Respiratory:  lungs clear, normal breath sounds, ++vent


Cardiovascular:  tachycardia, edema


Gastrointestinal:  soft, no guarding, no rebound, tenderness, decreased bowel 

sounds, overweight


Genitourinary:  no CVA tenderness


Musculoskeletal:  back normal, no calf tenderness


Neurologic:  alert, oriented, DTRs symmetric, sensory intact, motor weakness - 

Diffuse


Psychiatric:  mood/affect normal


Skin:  warm/dry, jaundice





Labs: reviewed





Imaging: noted in emr





Assessment and Recs


# Liver cancer.  The patient has ascites, CT 6.7 cm round exophytic mass arising

from segment 4A of the liver. This is presumably related to stated clinical 

history of liver cancer. Surgical clips are seen adjacent to this mass.


--> known history


--> in prior was on hospice


--> tumor markers order as prn


# Leukocytosis due to UTI, possible aspiration, healthcare-acquired pneumonia 

versus community-acquired pneumonia.  The patient has liver failure.  The 

patient has pancreatitis.


--> smear has been noted and reviewed


--> wbc 26-->16-->15


--> ABX vanc/zosyn


--> id recs are noted


--> trend as needed


--> may be related to underlying malignancy as well


# Coaguloapthy with hx of cirrhosis


--> inr 1.9


-> give ffp if bleeding 4 units


--> give vitamin k 10mg sq or iv (has been ordered 2/18)


# Cirrhosis


# Pancreatitis


# Renal insufficiency


# Atrial fibrillation with RVR


--> per cards, hold anticoag given slow bleed from central line


# UTI (urinary tract infection)


# Pleural effusion associated with hepatic disorder


# Hypertension.


# Dyslipidemia.


# Poor prognosis.


# Transamintiis


# DNR





Appreciate consultation and dw Suraj Holley MD          Feb 18, 2021 06:33

## 2021-02-18 NOTE — INTERNAL MED PROGRESS NOTE
Subjective


Physician Name


Jeevan Harper


Attending Physician


Jeevan Harper MD





Current Medications








 Medications


  (Trade)  Dose


 Ordered  Sig/Marleen


 Route


 PRN Reason  Start Time


 Stop Time Status Last Admin


Dose Admin


 


 Chlorhexidine


 Gluconate


  (Nivia-Hex 2%)  1 applic  DAILY@2000


 TOPIC


   2/17/21 20:00


 5/18/21 19:59  2/17/21 20:01





 


 Dextrose


  (Dextrose 50%)  25 ml  Q30M  PRN


 IV


 Hypoglycemia  2/17/21 07:30


 5/18/21 07:29   





 


 Dextrose


  (Dextrose 50%)  50 ml  Q30M  PRN


 IV


 Hypoglycemia  2/17/21 07:30


 5/18/21 07:29  2/17/21 11:42





 


 Diphenhydramine


 HCl


  (Benadryl)  25 mg  Q6H  PRN


 ORAL


 Itching/Pruritis  2/16/21 16:45


 3/18/21 16:44   





 


 Famotidine


  (Pepcid I.v.)  20 mg  Q12HR


 IVP


   2/17/21 16:15


 3/19/21 16:14  2/18/21 09:16





 


 Insulin Aspart


  (NovoLOG)    BEFORE MEALS AND  HS


 SUBQ


   2/17/21 11:30


 5/18/21 11:29   





 


 Lactulose


  (Cephulac)  20 gm  THREE TIMES A  DAY


 ORAL


   2/17/21 09:00


 3/19/21 08:59  2/18/21 09:17





 


 Lorazepam


  (Ativan)  1 mg  Q4H  PRN


 ORAL


 For Anxiety  2/16/21 16:45


 2/23/21 16:44  2/17/21 03:40





 


 Metoprolol


 Tartrate


  (Lopressor)  25 mg  Q12HR


 ORAL


   2/17/21 09:00


 5/18/21 08:59  2/17/21 08:29





 


 Morphine Sulfate


  (Morphine


 Sulfate)  2 mg  Q3H  PRN


 IVP


 Moderate Pain (Pain Scale 4-6)  2/16/21 16:45


 2/23/21 16:44  2/17/21 02:59





 


 Norepinephrine


 Bitartrate 16 mg/


 Dextrose  500 ml @ 0


 mls/hr  Q24H


 IV


   2/17/21 20:15


 2/20/21 20:14  2/18/21 14:05





 


 Octreotide


 Acetate 500 mcg/


 Sodium Chloride  500 ml @ 


 50 mls/hr  Q10H


 IV


   2/18/21 14:00


 3/20/21 13:59  2/18/21 14:06





 


 Ondansetron HCl


  (Zofran)  4 mg  Q6H  PRN


 IVP


 Nausea & Vomiting  2/16/21 16:45


 3/18/21 16:44   





 


 Pantoprazole


  (Protonix)  40 mg  EVERY 12  HOURS


 ORAL


   2/17/21 21:00


 3/19/21 20:59   





 


 Phenylephrine HCl


 100 mg/Dextrose  250 ml @ 0


 mls/hr  Q24H


 IV


   2/17/21 18:00


 2/20/21 17:59  2/18/21 13:16





 


 Piperacillin Sod/


 Tazobactam Sod


 3.375 gm/Sodium


 Chloride  110 ml @ 


 27.5 mls/hr  EVERY 8  HOURS


 IVPB


   2/17/21 10:00


 2/22/21 09:59  2/18/21 13:17





 


 Rifaximin


  (Xifaxan)  550 mg  EVERY 12  HOURS


 ORAL


   2/17/21 09:00


 2/24/21 08:59  2/18/21 09:17





 


 Sodium


 Bicarbonate 100


 ml/Dextrose/


 Sodium Chloride  1,100 ml @ 


 75 mls/hr  [id number]


 IV


   2/17/21 17:00


 3/19/21 16:59  2/18/21 06:00





 


 Temazepam


  (Restoril)  15 mg  DAILYPRN  PRN


 ORAL


 Insomnia  2/16/21 16:45


 2/23/21 16:44   





 


 Vancomycin HCl


  ([facility] pharmacy


 to dose)  1 ea  DAILY  PRN


 MISC


 Per rx protocol  2/17/21 08:00


 3/19/21 07:59   





 


 Vasopressin 100


 units/Sodium


 Chloride  100 ml @ 0


 mls/hr  Q24H


 IV


   2/17/21 19:30


 2/20/21 19:27  2/17/21 21:30











Allergies:  


Coded Allergies:  


     CEPHALEXIN (Verified  Allergy, Unknown, 2/16/21)


Subjective


in ICU, intubated, unable to respond, hypotension, WBC: 15.1, hemoglobin 8.5.





Objective





Last Vital Signs








  Date Time  Temp Pulse Resp B/P (MAP) Pulse Ox O2 Delivery O2 Flow Rate FiO2


 


2/18/21 14:05    90/65    


 


2/18/21 13:16  69      


 


2/18/21 12:30   35  97   


 


2/18/21 12:00      Mechanical Ventilator  


 


2/18/21 12:00        100


 


2/18/21 12:00 95.8       


 


2/16/21 13:11       2.0 











Laboratory Tests








Test


 2/17/21


17:09 2/17/21


20:15 2/18/21


04:00 2/18/21


04:34


 


Arterial Blood pH


 7.073


(7.350-7.450) 


 


 





 


Arterial Blood Partial


Pressure CO2 36.9 mmHg


(35.0-45.0) 


 


 





 


Arterial Blood Partial


Pressure O2 212.1 mmHg


(75.0-100.0)  H 


 


 





 


Arterial Blood HCO3


 10.5 mmol/L


(22.0-26.0)  *L 


 


 





 


Arterial Blood Oxygen


Saturation 99.6 %


() 


 


 





 


Arterial Blood Base Excess


 -18.1 (-2-2)


*L 


 


 





 


Justin Test Positive     


 


White Blood Count


 


 15.9 K/UL


(4.8-10.8)  H 15.1 K/UL


(4.8-10.8)  H 





 


Red Blood Count


 


 1.65 M/UL


(4.70-6.10)  L 2.83 M/UL


(4.70-6.10)  L 





 


Hemoglobin


 


 5.2 G/DL


(14.2-18.0) 8.5 G/DL


(14.2-18.0)  #L 





 


Hematocrit


 


 17.8 %


(42.0-52.0)  #L 28.6 %


(42.0-52.0)  #L 





 


Mean Corpuscular Volume


 


 108 FL (80-99)


H 101 FL (80-99)


H 





 


Mean Corpuscular Hemoglobin


 


 31.9 PG


(27.0-31.0)  H 30.2 PG


(27.0-31.0) 





 


Mean Corpuscular Hemoglobin


Concent 


 29.4 G/DL


(32.0-36.0)  L 29.9 G/DL


(32.0-36.0)  L 





 


Red Cell Distribution Width


 


 25.1 %


(11.6-14.8)  H 25.2 %


(11.6-14.8)  H 





 


Platelet Count


 


 71 K/UL


(150-450)  L 62 K/UL


(150-450)  L 





 


Mean Platelet Volume


 


 9.1 FL


(6.5-10.1) 7.9 FL


(6.5-10.1) 





 


Neutrophils (%) (Auto)


 


 % (45.0-75.0)


 % (45.0-75.0)


 





 


Lymphocytes (%) (Auto)


 


 % (20.0-45.0)


 % (20.0-45.0)


 





 


Monocytes (%) (Auto)   % (1.0-10.0)    % (1.0-10.0)   


 


Eosinophils (%) (Auto)   % (0.0-3.0)    % (0.0-3.0)   


 


Basophils (%) (Auto)   % (0.0-2.0)    % (0.0-2.0)   


 


Differential Total Cells


Counted 


 100  


 


 





 


Neutrophils % (Manual)  84 % (45-75)  H  


 


Lymphocytes % (Manual)  3 % (20-45)  L  


 


Monocytes % (Manual)  4 % (1-10)    


 


Eosinophils % (Manual)  0 % (0-3)    


 


Basophils % (Manual)  0 % (0-2)    


 


Band Neutrophils  9 % (0-8)  H  


 


Platelet Estimate  Decreased  L  


 


Platelet Morphology  Normal    


 


Polychromasia  1+    


 


Hypochromasia  2+    


 


Anisocytosis  3+    


 


Macrocytosis  1+    


 


Schistocytes  Occasional    


 


Sodium Level


 


 


 134 MMOL/L


(136-145)  L 





 


Potassium Level


 


 


 5.9 MMOL/L


(3.5-5.1)  H 





 


Chloride Level


 


 


 102 MMOL/L


() 





 


Carbon Dioxide Level


 


 


 11 MMOL/L


(21-32)  L 





 


Anion Gap


 


 


 23 mmol/L


(5-15)  H 





 


Blood Urea Nitrogen


 


 


 47 mg/dL


(7-18)  H 





 


Creatinine


 


 


 2.9 MG/DL


(0.55-1.30)  H 





 


Estimat Glomerular Filtration


Rate 


 


 22.5 mL/min


(>60) 





 


Glucose Level


 


 


 98 MG/DL


() 





 


Uric Acid


 


 


 7.9 MG/DL


(2.6-7.2)  H 





 


Calcium Level


 


 


 7.7 MG/DL


(8.5-10.1)  L 





 


Phosphorus Level


 


 


 8.8 MG/DL


(2.5-4.9)  H 





 


Magnesium Level


 


 


 2.3 MG/DL


(1.8-2.4) 





 


Iron Level


 


 


 50 ug/dL


() 





 


Total Iron Binding Capacity


 


 


 73 ug/dL


(250-450)  L 





 


Percent Iron Saturation   68 % (15-50)  H 


 


Unsaturated Iron Binding


 


 


 23 ug/dL


(112-346)  L 





 


Ferritin


 


 


 446 NG/ML


(8-388)  H 





 


Total Bilirubin


 


 


 21.7 MG/DL


(0.2-1.0)  H 





 


Direct Bilirubin


 


 


 13.1 MG/DL


(0.0-0.3)  H 





 


Gamma Glutamyl Transpeptidase   47 U/L (5-85)   


 


Aspartate Amino Transf


(AST/SGOT) 


 


 5760 U/L


(15-37)  H 





 


Alanine Aminotransferase


(ALT/SGPT) 


 


 792 U/L


(12-78)  H 





 


Alkaline Phosphatase


 


 


 512 U/L


()  H 





 


Ammonia


 


 


 78 umol/L


(11-32)  H 





 


Lactate Dehydrogenase


 


 


 2906 U/L


()  H 





 


Troponin I


 


 


 1.374 ng/mL


(0.000-0.056) 





 


C-Reactive Protein,


Quantitative 


 


 7.5 mg/dL


(0.00-0.90)  H 





 


Pro-B-Type Natriuretic Peptide


 


 


 8891 pg/mL


(0-125)  H 





 


Total Protein


 


 


 5.2 G/DL


(6.4-8.2)  L 





 


Albumin


 


 


 2.1 G/DL


(3.4-5.0)  L 





 


Globulin   3.1 g/dL   


 


Albumin/Globulin Ratio


 


 


 0.7 (1.0-2.7)


L 





 


Amylase Level


 


 


 1199 U/L


()  *H 





 


Lipase


 


 


 > 2000 U/L


()  H 





 


Alpha Fetoprotein   Pending   


 


Vitamin B12 Level


 


 


 > 2000 PG/ML


(193-986)  H 





 


Folate


 


 


 15.0 NG/ML


(8.6-58.9) 





 


Thyroid Stimulating Hormone


(TSH) 


 


 2.382 uiU/mL


(0.358-3.740) 





 


Random Vancomycin Level   20.6 ug/mL   


 


Hepatitis A IgM Antibody   Pending   


 


Hepatitis B Surface Antigen   Pending   


 


Hepatitis B Core IgM Antibody   Pending   


 


Hepatitis C Antibody   Pending   


 


POC Whole Blood Glucose    Pending  


 


Test


 2/18/21


07:10 2/18/21


09:32 2/18/21


11:55 





 


Prothrombin Time


 > 100.0 SEC


(9.30-11.50)  H 


 


 





 


Prothromb Time International


Ratio > 10.0


(0.9-1.1)  *H 


 


 





 


Activated Partial


Thromboplast Time > 150 SEC


(23-33)  *H 


 


 





 


Carcinoembryonic Antigen Pending     


 


CA 19-9 Antigen Pending     


 


 Antigen Pending     


 


Arterial Blood pH


 


 6.944


(7.350-7.450) 


 





 


Arterial Blood Partial


Pressure CO2 


 34.9 mmHg


(35.0-45.0)  L 


 





 


Arterial Blood Partial


Pressure O2 


 48.5 mmHg


(75.0-100.0) 


 





 


Arterial Blood HCO3


 


 7.4 mmol/L


(22.0-26.0)  *L 


 





 


Arterial Blood Oxygen


Saturation 


 73.3 %


()  *L 


 





 


Arterial Blood Base Excess


 


 -23.3 (-2-2)


*L 


 





 


Justin Test  N/a    


 


POC Whole Blood Glucose


 


 


 115 MG/DL


()  H 














Microbiology








 Date/Time


Source Procedure


Growth Status





 


 2/17/21 10:15


Blood Blood Culture - Preliminary Resulted


 


 2/16/21 14:24


Urine,Clean Catch Urine Culture - Final


Mixed Gram Positive Organism Complete


 


 2/16/21 11:11


Blood Blood Culture - Preliminary


NO GROWTH AFTER 24 HOURS Resulted


 


 2/16/21 11:10


Blood Blood Culture - Preliminary Resulted

















Intake and Output  


 


 2/17/21 2/18/21





 19:00 07:00


 


Intake Total 406.25 ml 1867.50 ml


 


Output Total 30 ml 0 ml


 


Balance 376.25 ml 1867.50 ml


 


  


 


IV Total 406.25 ml 1867.50 ml


 


Output Urine Total 30 ml 0 ml








Objective


GENERAL:  Alter, unresponsive, jaundiced.


HEENT:  Eyes with scleral icterus, otherwise pupils are equal and


responsive to light and accommodation.  , ET tube in the mouth.


NECK:  Supple , no JVD


CHEST:  mechanical breath sounds, no wheezes, decreased in bases.


CARDIOVASCULAR:  Regular rhythm and rate.  S1-S2 are normal without murmurs


ABDOMEN:  Distended, decreased bowel sounds with no rebound or guarding to 

palpitation.


RECTAL/GENITAL:  Refused.


NEUROLOGIC:  unable to obtain due to patient's status, unable to follow command,

unresponsive.





Assessment/Plan


Assessment/Plan


ASSESSMENT:  This is a 60-year-old  male.





1. Liver mass.


2. Liver failure / liver cirrhosis.


3. Urinary tract infection.


4. Atrial fibrillation with rapid ventricular rate.


5. Pancreatitis.


6. Coagulopathy.


7. Renal failure.


8. Elevated liver function tests.


9. Hypertension.


10. Hypercholesterolemia.


11. acute hypoxemic  respiratory failure.


12.  Septic shock.





TREATMENT:


1. Liver mass.  An Oncology consultation has been obtained with Dr. Lira.  Patient has a stated history of possible liver cancer, however


this has not been diagnosed.


2. Liver failure.  This may be secondary to liver cancer as above.  A


Gastroenterology consultation has been obtained with Dr. Shaq Linder.


3. Urinary tract infection.  Patient has been placed empirically on


intravenous Levaquin.  A urine culture is pending.


4. Atrial fibrillation with rapid ventricular rate.  A Cardiology


consultation has been obtained with Dr. Eduardo Briggs.


5. Pancreatitis.  As above, a Gastroenterology consultation has been


obtained with Dr. Shaq Linder.


6. Coagulopathy.  This is secondary to liver failure as above.


7. Renal failure.  This is probably secondary to hepatorenal failure.


8. Elevated liver function tests.


9. Hypertension.  Continue metoprolol as above.


10. Hypercholesterolemia.  Continue atorvastatin as above.





CODE STATUS: DNR


Hypotension, poor prognosis.











Jeevan Harper MD                 Feb 18, 2021 14:33

## 2021-02-18 NOTE — GENERAL PROGRESS NOTE
Subjective


ROS Limited/Unobtainable:  No


Allergies:  


Coded Allergies:  


     CEPHALEXIN (Verified  Allergy, Unknown, 2/16/21)





Objective





Last 24 Hour Vital Signs








  Date Time  Temp Pulse Resp B/P (MAP) Pulse Ox O2 Delivery O2 Flow Rate FiO2


 


2/18/21 10:00  73 21 86/54 (65) 95   


 


2/18/21 09:30  69 27 79/45 (56) 94   


 


2/18/21 09:00  69 25 55/37 (43) 93   


 


2/18/21 09:00  70  75/55    


 


2/18/21 08:30  75 23 77/56 (63) 97   


 


2/18/21 08:00        60


 


2/18/21 08:00 96.5 75 42 85/54 (64) 97   


 


2/18/21 08:00      Mechanical Ventilator  


 


2/18/21 07:30  77 31 91/65 (74) 95   


 


2/18/21 07:04  76 16     100


 


2/18/21 07:00  80 29 82/60 (67) 97   


 


2/18/21 07:00    82/46    


 


2/18/21 06:30  82 26 91/70 (77) 100   


 


2/18/21 06:00    100/65    


 


2/18/21 06:00  82  84/59    


 


2/18/21 06:00  81 30 93/55 (68) 100   


 


2/18/21 05:45  81 23 98/67 (77) 100   


 


2/18/21 05:30  82 27 93/68 (76) 100   


 


2/18/21 05:00  82 19 84/59 (67) 99   


 


2/18/21 05:00    84/59    


 


2/18/21 04:30  83 25 83/57 (66) 97   


 


2/18/21 04:00        60


 


2/18/21 04:00    102/65    


 


2/18/21 04:00 93.0 82 29 106/56 (73) 96   


 


2/18/21 04:00  93      


 


2/18/21 04:00      Mechanical Ventilator  


 


2/18/21 03:30  86 19 90/67 (75) 98   


 


2/18/21 03:23  87 16     60


 


2/18/21 03:00    88/56    


 


2/18/21 03:00  87 48 104/64 (77) 98   


 


2/18/21 02:30  86 35 102/66 (78) 98   


 


2/18/21 02:00    106/70    


 


2/18/21 02:00  91 37 102/70 (81) 98   


 


2/18/21 01:30  89 36 105/62 (76) 98   


 


2/18/21 01:00    100/65    


 


2/18/21 01:00  89 31 99/70 (80) 98   


 


2/18/21 00:30  89 31 99/71 (80) 98   


 


2/18/21 00:00  89 29 100/51 (67) 97   


 


2/18/21 00:00        60


 


2/18/21 00:00    97/69    


 


2/18/21 00:00  91  106/70    


 


2/18/21 00:00      Mechanical Ventilator  


 


2/17/21 23:30  90 26 91/53 (66) 100   


 


2/17/21 23:14  91 16     80


 


2/17/21 23:00    98/58    


 


2/17/21 23:00  90 36 98/55 (69) 100   


 


2/17/21 22:45  82 31 101/58 (72) 99   


 


2/17/21 22:30  91 29 96/60 (72) 100   


 


2/17/21 22:15  91 18 97/65 (76) 100   


 


2/17/21 22:00  90 20 98/51 (67) 100   


 


2/17/21 22:00    98/51    


 


2/17/21 21:45  93 16 106/65 (79) 100   


 


2/17/21 21:30  87 31 93/57 (69) 100   


 


2/17/21 21:15  86 16 86/50 (62) 100   


 


2/17/21 21:00    80/46    


 


2/17/21 21:00  87 33 80/46 (57) 100   


 


2/17/21 20:45  85 33 80/21 (40) 100   


 


2/17/21 20:36    73/13    


 


2/17/21 20:30  89 30 84/52 (63) 100   


 


2/17/21 20:15  92 16 100/70 (80) 100   


 


2/17/21 20:00  89      


 


2/17/21 20:00 95.0 91 19 89/55 (66) 100   


 


2/17/21 20:00      Mechanical Ventilator  


 


2/17/21 20:00        80


 


2/17/21 19:51  92 31 81/55 (64) 100   


 


2/17/21 19:45  92 34 72/42 (52) 100   


 


2/17/21 19:30  93 37 88/56 (67) 100   


 


2/17/21 19:29  94 16     80


 


2/17/21 19:26  85  73/13    


 


2/17/21 19:21  94 30 101/58 (72) 100   


 


2/17/21 19:20  94 30 88/55 (66) 100   


 


2/17/21 19:15  91 32 99/70 (80) 100   


 


2/17/21 19:00  83 25 89/57 (68) 100   


 


2/17/21 18:45  83 25 89/57 (68) 100   


 


2/17/21 18:42  85  73/13    


 


2/17/21 18:30  87 30 73/13 (33) 100   


 


2/17/21 18:21    65/25    


 


2/17/21 18:15  90 31 65/25 (38) 100   


 


2/17/21 18:00  93 33 75/36 (49) 100   


 


2/17/21 17:45  96 30 85/42 (56) 100   


 


2/17/21 17:30  98 30 88/62 (71) 100   


 


2/17/21 17:25        80


 


2/17/21 17:15  91 18 78/46 (57) 100   


 


2/17/21 17:00  98 21 70/55 (60) 100   


 


2/17/21 16:49  99 22 70/51 (57) 100   


 


2/17/21 16:45  102 22 73/50 (58) 97   


 


2/17/21 16:30  106 23 82/68 (73) 97   


 


2/17/21 16:20  111 33 123/80 (94) 95   


 


2/17/21 16:10  116 34 143/75 (97) 63   


 


2/17/21 16:05  112 20     100


 


2/17/21 16:00      Mechanical Ventilator  


 


2/17/21 16:00 96.3 57 29 170/109 (129) 93   


 


2/17/21 15:50  97 22 43/26 (32) 93   


 


2/17/21 15:40  113 55 118/84 (95) 93   


 


2/17/21 15:30    43/26    


 


2/17/21 15:30  47 20 56/23 (34) 87   


 


2/17/21 15:28  48 20 70/18 (35) 86   


 


2/17/21 15:15  75 20 63/24 (37) 90   


 


2/17/21 15:00 96.5 65 20 76/56 (63) 96   


 


2/17/21 14:53  50 18  100   100


 


2/17/21 14:53  50 18  94 Bi-Pap  100

















Intake and Output  


 


 2/17/21 2/18/21





 19:00 07:00


 


Intake Total 406.25 ml 1867.50 ml


 


Output Total 30 ml 0 ml


 


Balance 376.25 ml 1867.50 ml


 


  


 


IV Total 406.25 ml 1867.50 ml


 


Output Urine Total 30 ml 0 ml








Laboratory Tests


2/17/21 12:31: POC Whole Blood Glucose 116H


2/17/21 13:21: 


Arterial Blood pH 7.063*L, Arterial Blood Partial Pressure CO2 43.5, Arterial 

Blood Partial Pressure O2 81.2, Arterial Blood HCO3 12.1*L, Arterial Blood 

Oxygen Saturation 91.4L, Arterial Blood Base Excess -17.0*L, Justin Test Positive


2/17/21 17:09: 


Arterial Blood pH 7.073*L, Arterial Blood Partial Pressure CO2 36.9, Arterial 

Blood Partial Pressure O2 212.1H, Arterial Blood HCO3 10.5*L, Arterial Blood 

Oxygen Saturation 99.6, Arterial Blood Base Excess -18.1*L, Justin Test Positive


2/17/21 20:15: 


White Blood Count 15.9H, Red Blood Count 1.65L, Hemoglobin 5.2#*L, Hematocrit 

17.8#L, Mean Corpuscular Volume 108H, Mean Corpuscular Hemoglobin 31.9H, Mean 

Corpuscular Hemoglobin Concent 29.4L, Red Cell Distribution Width 25.1H, 

Platelet Count 71L, Mean Platelet Volume 9.1, Neutrophils (%) (Auto) , 

Lymphocytes (%) (Auto) , Monocytes (%) (Auto) , Eosinophils (%) (Auto) , 

Basophils (%) (Auto) , Differential Total Cells Counted 100, Neutrophils % 

(Manual) 84H, Lymphocytes % (Manual) 3L, Monocytes % (Manual) 4, Eosinophils % (

Manual) 0, Basophils % (Manual) 0, Band Neutrophils 9H, Platelet Estimate 

DecreasedL, Platelet Morphology Normal, Polychromasia 1+, Hypochromasia 2+, 

Anisocytosis 3+, Macrocytosis 1+, Schistocytes Occasional


2/18/21 04:00: 


White Blood Count 15.1H, Red Blood Count 2.83L, Hemoglobin 8.5#L, Hematocrit 

28.6#L, Mean Corpuscular Volume 101H, Mean Corpuscular Hemoglobin 30.2, Mean 

Corpuscular Hemoglobin Concent 29.9L, Red Cell Distribution Width 25.2H, 

Platelet Count 62L, Mean Platelet Volume 7.9, Neutrophils (%) (Auto) , 

Lymphocytes (%) (Auto) , Monocytes (%) (Auto) , Eosinophils (%) (Auto) , 

Basophils (%) (Auto) , Sodium Level 134L, Potassium Level 5.9H, Chloride Level 

102, Carbon Dioxide Level 11L, Anion Gap 23H, Blood Urea Nitrogen 47H, 

Creatinine 2.9H, Estimat Glomerular Filtration Rate 22.5, Glucose Level 98, Uric

Acid 7.9H, Calcium Level 7.7L, Phosphorus Level 8.8H, Magnesium Level 2.3, Iron 

Level 50, Total Iron Binding Capacity 73L, Percent Iron Saturation 68H, 

Unsaturated Iron Binding 23L, Ferritin 446H, Total Bilirubin 21.7H, Direct 

Bilirubin 13.1H, Gamma Glutamyl Transpeptidase 47, Aspartate Amino Transf (

AST/SGOT) 5760H, Alanine Aminotransferase (ALT/SGPT) 792H, Alkaline Phosphatase 

512H, Ammonia 78H, Lactate Dehydrogenase 2906H, Troponin I 1.374H, C-Reactive 

Protein, Quantitative 7.5H, Pro-B-Type Natriuretic Peptide 8891H, Total Protein 

5.2L, Albumin 2.1L, Globulin 3.1, Albumin/Globulin Ratio 0.7L, Amylase Level 

1199*H, Lipase > 2000H, Alpha Fetoprotein [Pending], Vitamin B12 Level > 2000H, 

Folate 15.0, Thyroid Stimulating Hormone (TSH) 2.382, Random Vancomycin Level 

20.6, Hepatitis A IgM Antibody [Pending], Hepatitis B Surface Antigen [Pending],

 Hepatitis B Core IgM Antibody [Pending], Hepatitis C Antibody [Pending]


2/18/21 04:34: POC Whole Blood Glucose [Pending]


2/18/21 07:10: 


Prothrombin Time > 100.0H, Prothromb Time International Ratio > 10.0*H, 

Activated Partial Thromboplast Time > 150*H, Carcinoembryonic Antigen [Pending],

 CA 19-9 Antigen [Pending],  Antigen [Pending]


2/18/21 09:32: 


Arterial Blood pH 6.944*L, Arterial Blood Partial Pressure CO2 34.9L, Arterial 

Blood Partial Pressure O2 48.5*L, Arterial Blood HCO3 7.4*L, Arterial Blood 

Oxygen Saturation 73.3*L, Arterial Blood Base Excess -23.3*L, Justin Test N/a


2/18/21 11:55: POC Whole Blood Glucose 115H


Height (Feet):  5


Height (Inches):  7.00


Weight (Pounds):  175


General Appearance:  lethargic


EENT:  normal ENT inspection


Neck:  supple


Cardiovascular:  tachycardia


Respiratory/Chest:  decreased breath sounds


Abdomen:  hypoactive bowel sounds


Extremities:  non-tender





Assessment/Plan


Problem List:  


(1) Respiratory failure


ICD Codes:  J96.90 - Respiratory failure, unspecified, unspecified whether with 

hypoxia or hypercapnia


SNOMED:  353120139


(2) Liver failure


ICD Codes:  K72.90 - Hepatic failure, unspecified without coma


SNOMED:  59003213


Qualifiers:  


   Qualified Codes:  K72.00 - Acute and subacute hepatic failure without coma


(3) Pancreatitis


ICD Codes:  K85.90 - Acute pancreatitis without necrosis or infection, 

unspecified


SNOMED:  97327097


Qualifiers:  


   Qualified Codes:  K85.90 - Acute pancreatitis without necrosis or infection, 

unspecified


(4) Coagulopathy


ICD Codes:  D68.9 - Coagulation defect, unspecified


SNOMED:  72704907


Assessment/Plan:


now intubated in the ICU





transfuse FFP


npo


octreotide


ppi


DNR


poor prognosis











Shaq Linder MD             Feb 18, 2021 12:25

## 2021-02-18 NOTE — NUR
NURSE NOTES:



Family is at bedside and aware that pt's cellphone (Iphone) is not here at pt's bedside. 

-------------------------------------------------------------------------------

Addendum: 02/18/21 at 2001 by Arnaud Cheney RN

-------------------------------------------------------------------------------

leonardo who is night time night charge also aware and notified.

## 2021-02-19 NOTE — CARDIOLOGY REPORT
--------------- APPROVED REPORT --------------





EKG Measurement

Heart [id number]

[id number]

[id number]

[id number]



<Conclusion>

Atrial fibrillation with rapid ventricular response

Low voltage QRS

Nonspecific T wave abnormality

Abnormal ECG

## 2021-02-21 NOTE — CARDIOLOGY REPORT
--------------- APPROVED REPORT --------------





EXAM: Two-dimensional and M-mode echocardiogram with Doppler and color Doppler.



INDICATION

Atrial flutter



M-Mode DIMENSIONS 

IVSd1.1 (0.7-1.1cm)Left Atrium (MM)4.2 (1.6-4.0cm)

LVDd4.3 (3.5-5.6cm)Aortic Root3.5 (2.0-3.7cm)

PWd1.1 (0.7-1.1cm)Aortic Cusp Exc.2.1 (1.5-2.0cm)

IVSs1.8 cmEPSS0.5 (>1.0cm)

LVDs2.9 (2.5-4.0cm)

PWs1.1 cm



<Conclusion>

Normal left ventricular chamber size. Mildly depressed systolic function and wall motion.

Left ventricular ejection fraction estimated to be 50 %.

No evidence of left ventricular hypertrophy.

No evidence of pericardial effusion. 

Mild left atrial enlargement. 

Right cardiac chamber sizes are within normal limits.

Focal aortic valve sclerosis with adequate cusp excursion.

Thickened mitral valve leaflets with normal excursion.

Mitral annulus and aortic root calcification.

Normal pulmonic valve structure. 

Normal tricuspid valve structure. 

IVC at normal size without physiologic collapse.



A  color flow and spectral Doppler study was performed and revealed:

Mild aortic regurgitation.

Moderate to Severe mitral regurgitation.

Mitral inflow indicates normal left ventricular diastolic function. 

Moderate tricuspid regurgitation.

Tricuspid  systolic velocities suggests peak right ventricular systolic pressure of  38 mmHg, 

consistent with mild pulmonary hypertension.

## 2021-02-23 NOTE — DISCHARGE SUMMARY
Discharge Summary


Discharge Summary


_


DEATH SUMMARY 








DATE OF ADMISSION: 2/16/2021





DATE OF EXPIRATION: 2/18/2021  





REASON FOR ADMISSION: 


60 years old male with past medical history of hypertension , 

hypercholesterolemia , questionable history of liver cancer, presented to ED 

complaining of generalized pain .


Patient was found to be in liver failure .


Patient  was also noted to have atrial fibrillation with rapid ventricular 

response.


Patient admitted with a new onset of liver failure liver failure.  


Laboratory work-up revealed leukocytosis,  anemia and thrombocytopenia.  


.  CRP 12.6.


Initial troponin negative.  pro BNP 2317.    


Lipase above 2000.  


Total bilirubin 26 .direct bilirubin 18.8.


, ALT 74.  Alkaline phosphatase 634


Ammonia 71.


Lactic acid 3.4 


 


CONSULTANTS:


cardiologist Dr. Briggs


ID specialist 


GI specialist Dr. Linder


nephrologist Dr. Watts


surgery Dr. Norman


endocrinologist  


hematologist/oncologist Dr. Lira








[facility] COURSE: 


Patient admitted.  


The patient noted to be septic , hypothermic and with leukocytosis 


He recently was hospitalized with perinephric abscess.  


Patient started on empiric antibiotic as per ID specialist.  





CT  scan of the abdomen and pelvis revealed hepatic cirrhosis with atrophy and 

surface nodularity.  


6.5 cm round exophytic mass arising arising from the liver.  Presumably related 

to stated clinical history of liver cancer.  


Evidence of portal hypertension with large periesophageal and perigastric 

varices.  Moderate ascites , likely related to liver disease.


Diverticulosis without evidence of diverticulitis.  Borderline cardiomegaly .


Chest x-ray revealed massive right pleural effusion.


Abdominal ultrasound demonstrated evidence of hepatic cirrhosis.  


Large right pleural effusion.  


Moderate ascites and borderline splenomegaly.  


Venous duplex was negative for evidence of acute DVT.





On 2/ 17 patient sustained cardiopulmonary arrest and was intubated.  


Patient was also hemodynamically unstable and started on pressors , and 

subsequently was  transferred to ICU for further management.


Echocardiogram demonstrated  mildly depressed systolic function and wall motion 

with ejection fraction estimated to be 50%.  


No evidence of pericardial effusion.  Right ventricular systolic pressure of 38 

consistent with a mild pulmonary hypertension.  


Moderate to severe mitral regurgitation.  





Multiple specialties followed-up.


Patient was on empiric antibiotic as per ID specialist recommendation.  


Blood culture revealed staph epidermidis .


Urine culture revealed mixed gram-positive organisms.  


Patient had persistent leukocytosis with a trend up and was hypothermic.  


Patient was followed-up with   chest x-ray , which showed worsening  left lung 

infiltrate . 





IV fluids with bicarbonate provided.  


Patient received lactulose for elevated ammonia.


LFT , lipase and amylase were closely monitored.  


Patient remain n.p.o. 


Patient received fresh fresh frozen plasma.  


Patient was on PPI and octreotide.  .  


Ventilator support and pulmonary toilet provided.  


Patient  required multiple pressors  for hemodynamic support.


Patient was kept  off  anticoagulation,  given significant coagulopathy 

secondary to cirrhosis. 





Overall prognosis was poor .


laboratory work-up  showed worsening serum creatinine  and rising serum 

bilirubin.


Patient was not a candidate for dialysis.  


Supportive care provided.  


Patient's condition and poor prognosis  was discussed with the family


Patient subsequently was made DNR/DNI.  


Patient condition was rapidly deteriorating. 


He was on 3 different pressors with max dosing, but remained hemodynamically 

unstable.


He was pronounced on 2/18 at 19: 28.  


Cause of death: cardiopulmonary arrest.








FINAL DIAGNOSES: 


Septic shock 


Sepsis 


UTI


Probably  PNA


Status post cardiopulmonary arrest


Acute hypoxemic respiratory failure , requiring intubation


Liver mass


Liver failure


Atrial fibrillation with rapid ventricular response


Pleural effusion,  associated with hepatic disorder


Pancreatitis


Coagulopathy


Acute renal failure 


Transaminitis


Anemia


 


 





I have been assigned to dictate discharge summary for this account. 


I was not involved in the patient's management.











Savi Dennis NP                Feb 23, 2021 12:01